# Patient Record
Sex: FEMALE | Race: WHITE | URBAN - METROPOLITAN AREA
[De-identification: names, ages, dates, MRNs, and addresses within clinical notes are randomized per-mention and may not be internally consistent; named-entity substitution may affect disease eponyms.]

---

## 2017-08-23 ENCOUNTER — OFFICE VISIT (OUTPATIENT)
Dept: FAMILY MEDICINE CLINIC | Age: 22
End: 2017-08-23

## 2017-08-23 VITALS
HEART RATE: 84 BPM | HEIGHT: 64 IN | DIASTOLIC BLOOD PRESSURE: 80 MMHG | OXYGEN SATURATION: 98 % | BODY MASS INDEX: 32.28 KG/M2 | RESPIRATION RATE: 16 BRPM | TEMPERATURE: 98.7 F | WEIGHT: 189.1 LBS | SYSTOLIC BLOOD PRESSURE: 118 MMHG

## 2017-08-23 DIAGNOSIS — Z72.0 TOBACCO ABUSE: Chronic | ICD-10-CM

## 2017-08-23 DIAGNOSIS — E66.09 NON MORBID OBESITY DUE TO EXCESS CALORIES: ICD-10-CM

## 2017-08-23 DIAGNOSIS — Z00.00 WELL ADULT EXAM: Primary | ICD-10-CM

## 2017-08-23 DIAGNOSIS — Z11.3 SCREEN FOR STD (SEXUALLY TRANSMITTED DISEASE): ICD-10-CM

## 2017-08-23 LAB
BASOPHILS ABSOLUTE: 0.1 K/UL (ref 0–0.2)
BASOPHILS RELATIVE PERCENT: 0.5 %
EOSINOPHILS ABSOLUTE: 0.4 K/UL (ref 0–0.6)
EOSINOPHILS RELATIVE PERCENT: 4.5 %
HCT VFR BLD CALC: 43.9 % (ref 36–48)
HEMOGLOBIN: 14.5 G/DL (ref 12–16)
LYMPHOCYTES ABSOLUTE: 2.1 K/UL (ref 1–5.1)
LYMPHOCYTES RELATIVE PERCENT: 21.6 %
MCH RBC QN AUTO: 31.7 PG (ref 26–34)
MCHC RBC AUTO-ENTMCNC: 33 G/DL (ref 31–36)
MCV RBC AUTO: 96.1 FL (ref 80–100)
MONOCYTES ABSOLUTE: 0.8 K/UL (ref 0–1.3)
MONOCYTES RELATIVE PERCENT: 7.9 %
NEUTROPHILS ABSOLUTE: 6.4 K/UL (ref 1.7–7.7)
NEUTROPHILS RELATIVE PERCENT: 65.5 %
PDW BLD-RTO: 13.2 % (ref 12.4–15.4)
PLATELET # BLD: 247 K/UL (ref 135–450)
PMV BLD AUTO: 9.3 FL (ref 5–10.5)
RBC # BLD: 4.56 M/UL (ref 4–5.2)
WBC # BLD: 9.8 K/UL (ref 4–11)

## 2017-08-23 PROCEDURE — 90471 IMMUNIZATION ADMIN: CPT | Performed by: FAMILY MEDICINE

## 2017-08-23 PROCEDURE — 90732 PPSV23 VACC 2 YRS+ SUBQ/IM: CPT | Performed by: FAMILY MEDICINE

## 2017-08-23 PROCEDURE — 90472 IMMUNIZATION ADMIN EACH ADD: CPT | Performed by: FAMILY MEDICINE

## 2017-08-23 PROCEDURE — 99395 PREV VISIT EST AGE 18-39: CPT | Performed by: FAMILY MEDICINE

## 2017-08-23 PROCEDURE — 90649 4VHPV VACCINE 3 DOSE IM: CPT | Performed by: FAMILY MEDICINE

## 2017-08-23 RX ORDER — ALBUTEROL SULFATE 90 UG/1
AEROSOL, METERED RESPIRATORY (INHALATION)
Qty: 8.5 G | Refills: 2 | Status: SHIPPED | OUTPATIENT
Start: 2017-08-23

## 2017-08-23 ASSESSMENT — ENCOUNTER SYMPTOMS
VOMITING: 0
ABDOMINAL PAIN: 0
SHORTNESS OF BREATH: 0
EYE ITCHING: 0
WHEEZING: 0
EYE REDNESS: 0
CHEST TIGHTNESS: 0
TROUBLE SWALLOWING: 0
RHINORRHEA: 0
NAUSEA: 0

## 2017-08-24 LAB
CHOLESTEROL, TOTAL: 137 MG/DL (ref 0–199)
HBV SURFACE AB TITR SER: <3.5 MUL/ML
HDLC SERPL-MCNC: 44 MG/DL (ref 40–60)
HEPATITIS C ANTIBODY INTERPRETATION: NORMAL
HIV-1 AND HIV-2 ANTIBODIES: NORMAL
LDL CHOLESTEROL CALCULATED: 78 MG/DL
RPR: NORMAL
TRIGL SERPL-MCNC: 73 MG/DL (ref 0–150)
VLDLC SERPL CALC-MCNC: 15 MG/DL

## 2017-08-29 ENCOUNTER — TELEPHONE (OUTPATIENT)
Dept: FAMILY MEDICINE CLINIC | Age: 22
End: 2017-08-29

## 2017-12-05 ENCOUNTER — TELEPHONE (OUTPATIENT)
Dept: FAMILY MEDICINE CLINIC | Age: 22
End: 2017-12-05

## 2017-12-05 RX ORDER — QUETIAPINE FUMARATE 100 MG/1
100 TABLET, FILM COATED ORAL NIGHTLY PRN
Qty: 30 TABLET | Refills: 0 | Status: SHIPPED | OUTPATIENT
Start: 2017-12-05 | End: 2018-02-26 | Stop reason: SDUPTHER

## 2017-12-05 NOTE — TELEPHONE ENCOUNTER
[phone]    Marge Hoffman    PT was in rehab, was given 100 MG Seraquil (sleep aid)  Wants to know if she can get a script for it now that she's out. PT has been out of rehab for a couple of months, but has been doing outpatient classes. PT last seen 8/23/17    Please advise patient.

## 2018-02-26 ENCOUNTER — OFFICE VISIT (OUTPATIENT)
Dept: FAMILY MEDICINE CLINIC | Age: 23
End: 2018-02-26

## 2018-02-26 VITALS
BODY MASS INDEX: 35.68 KG/M2 | HEIGHT: 64 IN | HEART RATE: 85 BPM | OXYGEN SATURATION: 98 % | DIASTOLIC BLOOD PRESSURE: 68 MMHG | WEIGHT: 209 LBS | SYSTOLIC BLOOD PRESSURE: 106 MMHG | TEMPERATURE: 98 F | RESPIRATION RATE: 16 BRPM

## 2018-02-26 DIAGNOSIS — R05.9 COUGH: Primary | ICD-10-CM

## 2018-02-26 PROCEDURE — G8484 FLU IMMUNIZE NO ADMIN: HCPCS | Performed by: FAMILY MEDICINE

## 2018-02-26 PROCEDURE — G8417 CALC BMI ABV UP PARAM F/U: HCPCS | Performed by: FAMILY MEDICINE

## 2018-02-26 PROCEDURE — 4004F PT TOBACCO SCREEN RCVD TLK: CPT | Performed by: FAMILY MEDICINE

## 2018-02-26 PROCEDURE — G8427 DOCREV CUR MEDS BY ELIG CLIN: HCPCS | Performed by: FAMILY MEDICINE

## 2018-02-26 PROCEDURE — 99213 OFFICE O/P EST LOW 20 MIN: CPT | Performed by: FAMILY MEDICINE

## 2018-02-26 RX ORDER — FLUTICASONE PROPIONATE 50 MCG
2 SPRAY, SUSPENSION (ML) NASAL DAILY
Qty: 1 BOTTLE | Refills: 3 | Status: SHIPPED | OUTPATIENT
Start: 2018-02-26 | End: 2019-09-16 | Stop reason: SDUPTHER

## 2018-02-26 RX ORDER — QUETIAPINE FUMARATE 100 MG/1
100 TABLET, FILM COATED ORAL NIGHTLY PRN
Qty: 30 TABLET | Refills: 3 | Status: SHIPPED | OUTPATIENT
Start: 2018-02-26 | End: 2019-05-23 | Stop reason: ALTCHOICE

## 2018-02-26 ASSESSMENT — ENCOUNTER SYMPTOMS
WHEEZING: 0
SORE THROAT: 0
HEMOPTYSIS: 0
HEARTBURN: 0
RHINORRHEA: 0
COUGH: 1
SHORTNESS OF BREATH: 0

## 2018-02-26 NOTE — PROGRESS NOTES
Subjective:      Patient ID: Quan Collazo is a 20 y.o. female. Cough   This is a recurrent problem. The current episode started 1 to 4 weeks ago. The problem has been waxing and waning. The problem occurs every few minutes. The cough is productive of sputum. Associated symptoms include nasal congestion and postnasal drip. Pertinent negatives include no chest pain, chills, ear congestion, ear pain, fever, headaches, heartburn, hemoptysis, myalgias, rash, rhinorrhea, sore throat, shortness of breath, sweats, weight loss or wheezing. Nothing aggravates the symptoms. Risk factors for lung disease include smoking/tobacco exposure. She has tried a beta-agonist inhaler for the symptoms. The treatment provided moderate relief. Her past medical history is significant for asthma, bronchitis and environmental allergies. Review of Systems   Constitutional: Negative for chills, fever and weight loss. HENT: Positive for postnasal drip. Negative for ear pain, rhinorrhea and sore throat. Respiratory: Positive for cough. Negative for hemoptysis, shortness of breath and wheezing. Cardiovascular: Negative for chest pain. Gastrointestinal: Negative for heartburn. Musculoskeletal: Negative for myalgias. Skin: Negative for rash. Allergic/Immunologic: Positive for environmental allergies. Neurological: Negative for headaches. Objective:   Physical Exam   Constitutional: She is oriented to person, place, and time. She appears well-developed and well-nourished. No distress. HENT:   Head: Normocephalic. Head is without right periorbital erythema and without left periorbital erythema. Right Ear: Hearing and external ear normal. No drainage, swelling or tenderness. No middle ear effusion. No decreased hearing is noted. Left Ear: Hearing and external ear normal. No drainage, swelling or tenderness. No middle ear effusion. No decreased hearing is noted. Nose: Mucosal edema present.  No rhinorrhea,

## 2018-07-30 ENCOUNTER — OFFICE VISIT (OUTPATIENT)
Dept: FAMILY MEDICINE CLINIC | Age: 23
End: 2018-07-30

## 2018-07-30 VITALS
WEIGHT: 221 LBS | SYSTOLIC BLOOD PRESSURE: 110 MMHG | HEIGHT: 64 IN | OXYGEN SATURATION: 98 % | HEART RATE: 99 BPM | RESPIRATION RATE: 16 BRPM | TEMPERATURE: 97.8 F | DIASTOLIC BLOOD PRESSURE: 76 MMHG | BODY MASS INDEX: 37.73 KG/M2

## 2018-07-30 DIAGNOSIS — Z71.6 TOBACCO ABUSE COUNSELING: ICD-10-CM

## 2018-07-30 DIAGNOSIS — Z72.0 TOBACCO ABUSE: ICD-10-CM

## 2018-07-30 DIAGNOSIS — H69.81 DYSFUNCTION OF RIGHT EUSTACHIAN TUBE: ICD-10-CM

## 2018-07-30 DIAGNOSIS — J45.40 MODERATE PERSISTENT ASTHMA WITHOUT COMPLICATION: Primary | ICD-10-CM

## 2018-07-30 DIAGNOSIS — K21.9 GASTROESOPHAGEAL REFLUX DISEASE WITHOUT ESOPHAGITIS: ICD-10-CM

## 2018-07-30 PROCEDURE — 4004F PT TOBACCO SCREEN RCVD TLK: CPT | Performed by: FAMILY MEDICINE

## 2018-07-30 PROCEDURE — 99214 OFFICE O/P EST MOD 30 MIN: CPT | Performed by: FAMILY MEDICINE

## 2018-07-30 PROCEDURE — G8417 CALC BMI ABV UP PARAM F/U: HCPCS | Performed by: FAMILY MEDICINE

## 2018-07-30 PROCEDURE — G8427 DOCREV CUR MEDS BY ELIG CLIN: HCPCS | Performed by: FAMILY MEDICINE

## 2018-07-30 RX ORDER — OMEPRAZOLE 20 MG/1
20 CAPSULE, DELAYED RELEASE ORAL DAILY
Qty: 30 CAPSULE | Refills: 3 | Status: SHIPPED | OUTPATIENT
Start: 2018-07-30

## 2018-07-30 RX ORDER — ALBUTEROL SULFATE 90 UG/1
2 AEROSOL, METERED RESPIRATORY (INHALATION) EVERY 6 HOURS PRN
Qty: 1 INHALER | Refills: 3 | Status: SHIPPED | OUTPATIENT
Start: 2018-07-30

## 2018-07-30 RX ORDER — PREDNISONE 20 MG/1
40 TABLET ORAL DAILY
Qty: 10 TABLET | Refills: 0 | Status: SHIPPED | OUTPATIENT
Start: 2018-07-30 | End: 2018-08-04

## 2018-07-30 RX ORDER — FLUTICASONE PROPIONATE 50 MCG
1 SPRAY, SUSPENSION (ML) NASAL DAILY
Qty: 1 BOTTLE | Refills: 3 | Status: SHIPPED | OUTPATIENT
Start: 2018-07-30

## 2018-07-30 ASSESSMENT — PATIENT HEALTH QUESTIONNAIRE - PHQ9
1. LITTLE INTEREST OR PLEASURE IN DOING THINGS: 0
SUM OF ALL RESPONSES TO PHQ QUESTIONS 1-9: 0
SUM OF ALL RESPONSES TO PHQ9 QUESTIONS 1 & 2: 0
2. FEELING DOWN, DEPRESSED OR HOPELESS: 0

## 2018-07-30 ASSESSMENT — ENCOUNTER SYMPTOMS
COUGH: 1
SORE THROAT: 0
HEARTBURN: 0
RHINORRHEA: 0
HEMOPTYSIS: 0
SHORTNESS OF BREATH: 1
WHEEZING: 1

## 2018-07-30 NOTE — PROGRESS NOTES
ibuprofen (ADVIL;MOTRIN) 800 MG tablet, Take 1 tablet by mouth every 8 hours as needed for Pain, Disp: 30 tablet, Rfl: 0    ondansetron (ZOFRAN ODT) 4 MG disintegrating tablet, Take 1-2 tablets by mouth every 12 hours as needed for Nausea May Sub regular tablet (non-ODT) if insurance does not cover ODT., Disp: 12 tablet, Rfl: 0    famotidine (PEPCID) 20 MG tablet, Take 1 tablet by mouth 2 times daily, Disp: 60 tablet, Rfl: 0    hyoscyamine (ANASPAZ;LEVSIN) 125 MCG tablet, Take 125 mcg by mouth every 4 hours as needed for Cramping, Disp: , Rfl:     levonorgestrel (MIRENA, 52 MG,) IUD 52 mg, 1 each by Intrauterine route once, Disp: , Rfl:     PROAIR  (90 BASE) MCG/ACT inhaler, INHALE 2 PUFFS EVERY 6 HOURS AS NEEDED FOR WHEEZING OR SHORTNESS OF BREATH, Disp: 8.5 g, Rfl: 0    sertraline (ZOLOFT) 50 MG tablet, Take 1 tablet by mouth daily. , Disp: 30 tablet, Rfl: 3     has a past medical history of Asthma and Chicken pox. No past surgical history on file. reports that she has been smoking Cigarettes. She has been smoking about 0.50 packs per day. She has never used smokeless tobacco. She reports that she does not drink alcohol or use drugs. family history includes Coronary Art Dis in her father; Urolithiasis in an other family member. Objective:   Physical Exam   Constitutional: She is oriented to person, place, and time. She appears well-developed and well-nourished. No distress. HENT:   Head: Normocephalic. Head is without right periorbital erythema and without left periorbital erythema. Right Ear: Hearing and external ear normal. No drainage, swelling or tenderness. No middle ear effusion. No decreased hearing is noted. Left Ear: Hearing and external ear normal. No drainage, swelling or tenderness. No middle ear effusion. No decreased hearing is noted. Nose: Mucosal edema present. No rhinorrhea, sinus tenderness, nasal deformity or septal deviation.  Right sinus exhibits no maxillary sinus tenderness and no frontal sinus tenderness. Left sinus exhibits no maxillary sinus tenderness and no frontal sinus tenderness. Mouth/Throat: Oropharynx is clear and moist. No oropharyngeal exudate. OP mile erythema, no exudates     Eyes: Conjunctivae and EOM are normal. Pupils are equal, round, and reactive to light. Neck: Normal range of motion. Neck supple. No JVD present. Cardiovascular: Normal rate, regular rhythm and normal heart sounds. Exam reveals no gallop and no friction rub. No murmur heard. Pulmonary/Chest: Effort normal. No respiratory distress. She has no wheezes. She has no rales. She exhibits no tenderness. Coarse breath sounds    No crackles. Musculoskeletal: Normal range of motion. Lymphadenopathy:     She has no cervical adenopathy. Neurological: She is alert and oriented to person, place, and time. No cranial nerve deficit. She exhibits normal muscle tone. Coordination normal.   Skin: Skin is warm. No rash noted. She is not diaphoretic. Psychiatric: She has a normal mood and affect. Thought content normal.   Nursing note and vitals reviewed. Assessment:       Diagnosis Orders   1. Moderate persistent asthma without complication  albuterol sulfate HFA (VENTOLIN HFA) 108 (90 Base) MCG/ACT inhaler    mometasone (ASMANEX HFA) 100 MCG/ACT AERO inhaler    predniSONE (DELTASONE) 20 MG tablet   2. Dysfunction of right eustachian tube  fluticasone (FLONASE) 50 MCG/ACT nasal spray   3. Gastroesophageal reflux disease without esophagitis  omeprazole (PRILOSEC) 20 MG delayed release capsule   4. Tobacco abuse     5. Tobacco abuse counseling             Plan:      1. asmanex bid   Albuterol prn   Prednisone for 5 days   Fu 2 weeks  Patient advised to go to Ed or call 911 if sx worsen,  agrees and verbalizes understanding    2. Flonase, heating pad, otc anti histaminc prn     3. prilosec     4.  Counseling to stop smoking    Mario Fischer received counseling on the

## 2018-12-21 ENCOUNTER — TELEPHONE (OUTPATIENT)
Dept: FAMILY MEDICINE CLINIC | Age: 23
End: 2018-12-21

## 2018-12-21 RX ORDER — GUAIFENESIN 600 MG/1
600 TABLET, EXTENDED RELEASE ORAL 2 TIMES DAILY
Qty: 30 TABLET | Refills: 0 | Status: SHIPPED | OUTPATIENT
Start: 2018-12-21 | End: 2019-01-05

## 2018-12-21 NOTE — TELEPHONE ENCOUNTER
Leonides    PT is coughing up yellow phlegh\m.   Stuffy nose - no discharge  Sore throat  No fever    PT took Nyquil last night    Wants to know if there is an OTC medication or if we can call in a script    [facility] [address] [place] [address], [address] [place] [address] -  [phone]

## 2019-04-15 ENCOUNTER — TELEPHONE (OUTPATIENT)
Dept: FAMILY MEDICINE CLINIC | Age: 24
End: 2019-04-15

## 2019-04-15 NOTE — TELEPHONE ENCOUNTER
Jc 80 patient    PT has been experiencing nausea for 2 days. She tried Pepto Bis,ol. But it's not working.     PT can keep soup down  Has not tried Sync.ME [address] [phone] [facility] [phone]    Last seen 7/30/2018    Please advise patient

## 2019-05-23 ENCOUNTER — OFFICE VISIT (OUTPATIENT)
Dept: FAMILY MEDICINE CLINIC | Age: 24
End: 2019-05-23
Payer: COMMERCIAL

## 2019-05-23 VITALS
TEMPERATURE: 97 F | DIASTOLIC BLOOD PRESSURE: 78 MMHG | RESPIRATION RATE: 16 BRPM | OXYGEN SATURATION: 97 % | BODY MASS INDEX: 39.47 KG/M2 | SYSTOLIC BLOOD PRESSURE: 104 MMHG | HEART RATE: 78 BPM | HEIGHT: 64 IN | WEIGHT: 231.2 LBS

## 2019-05-23 DIAGNOSIS — Z23 NEED FOR DIPHTHERIA-TETANUS-PERTUSSIS (TDAP) VACCINE: ICD-10-CM

## 2019-05-23 DIAGNOSIS — R06.83 SNORING: ICD-10-CM

## 2019-05-23 DIAGNOSIS — F51.04 CHRONIC INSOMNIA: ICD-10-CM

## 2019-05-23 DIAGNOSIS — R63.5 WEIGHT GAIN: ICD-10-CM

## 2019-05-23 DIAGNOSIS — F17.200 SMOKER: ICD-10-CM

## 2019-05-23 DIAGNOSIS — J45.20 MILD INTERMITTENT ASTHMA WITHOUT COMPLICATION: Primary | ICD-10-CM

## 2019-05-23 PROCEDURE — 90471 IMMUNIZATION ADMIN: CPT | Performed by: FAMILY MEDICINE

## 2019-05-23 PROCEDURE — 99214 OFFICE O/P EST MOD 30 MIN: CPT | Performed by: FAMILY MEDICINE

## 2019-05-23 PROCEDURE — 90715 TDAP VACCINE 7 YRS/> IM: CPT | Performed by: FAMILY MEDICINE

## 2019-05-23 RX ORDER — TRAZODONE HYDROCHLORIDE 50 MG/1
50 TABLET ORAL NIGHTLY
Qty: 90 TABLET | Refills: 1 | Status: SHIPPED | OUTPATIENT
Start: 2019-05-23

## 2019-05-23 ASSESSMENT — ENCOUNTER SYMPTOMS
RHINORRHEA: 0
WHEEZING: 0
TROUBLE SWALLOWING: 0
HOARSE VOICE: 0
HEARTBURN: 0
COUGH: 0
DIFFICULTY BREATHING: 0
CHEST TIGHTNESS: 0
SORE THROAT: 0
FREQUENT THROAT CLEARING: 1
HEMOPTYSIS: 0
SPUTUM PRODUCTION: 1
SHORTNESS OF BREATH: 0

## 2019-05-23 ASSESSMENT — PATIENT HEALTH QUESTIONNAIRE - PHQ9
SUM OF ALL RESPONSES TO PHQ QUESTIONS 1-9: 0
1. LITTLE INTEREST OR PLEASURE IN DOING THINGS: 0
SUM OF ALL RESPONSES TO PHQ QUESTIONS 1-9: 0
SUM OF ALL RESPONSES TO PHQ9 QUESTIONS 1 & 2: 0
2. FEELING DOWN, DEPRESSED OR HOPELESS: 0

## 2019-05-23 NOTE — PROGRESS NOTES
Subjective:      Patient ID: Alfred Meza is a 30 y.o. female. Here for fu       Asthma   She complains of frequent throat clearing and sputum production. There is no chest tightness, cough, difficulty breathing, hemoptysis, hoarse voice, shortness of breath or wheezing. This is a chronic problem. The current episode started more than 1 year ago. The problem occurs rarely. The problem has been unchanged. Associated symptoms include malaise/fatigue. Pertinent negatives include no appetite change, chest pain, dyspnea on exertion, ear congestion, ear pain, fever, headaches, heartburn, myalgias, nasal congestion, orthopnea, PND, postnasal drip, rhinorrhea, sneezing, sore throat, sweats, trouble swallowing or weight loss. Her symptoms are aggravated by pollen. Her symptoms are alleviated by beta-agonist. She reports significant improvement on treatment. Risk factors for lung disease include smoking/tobacco exposure. Her past medical history is significant for asthma. Insomnia  Onset of sx:  Months ago  Sx described as: unable to stayed asleep  Patient denies:anxiety or depression  Symptoms have been: stable  Medications tried:seroquel that causes fatigued, trazodone was helping in the past  Life style changes: yes  Worse with: nothing    Snoring  Onset years ago, worse with wt gain,   + fatigue  Occasional ha    Wt gain  Has not tried to diet or exericse    Smoker  Not ready to quit  Now she smokes 1ppd      Review of Systems   Constitutional: Positive for malaise/fatigue. Negative for appetite change, fever and weight loss. HENT: Negative for ear pain, hoarse voice, postnasal drip, rhinorrhea, sneezing, sore throat and trouble swallowing. Respiratory: Positive for sputum production. Negative for cough, hemoptysis, shortness of breath and wheezing. Cardiovascular: Negative for chest pain, dyspnea on exertion and PND. Gastrointestinal: Negative for heartburn.    Musculoskeletal: Negative for [facility]   4. Need for diphtheria-tetanus-pertussis (Tdap) vaccine  Tdap (age 10y-63y) IM (ADACEL)   5. Weight gain     6. Smoker             Plan:      1. Stable  encourage to quit smoking  Albuterol prn   Tdap today    2. Sleep hygiene  Change med to trazodone  patient has been instructed caution with driving or handling of heavy machinery while taking  this medication as it  can cause drowsiness,  patient agrees and verbalizes understanding     3. Referral    5. Counseling: encourage to adjust caloric and fat  intake to maintain or achieve ideal body weight, to emphasize fruits, vegetables, whole grains, if possible drink  vegetable milks, reduce meats, poultry, fish, and rich in legume like beans,lentus, chickpeas ,  nuts. Exercise is a life-long commitment. 6. Smoking cessation was encouraged. Cessation techniques reviewed today. Fu 4 weeks.           Vashti Armijo MD

## 2019-08-20 ENCOUNTER — TELEPHONE (OUTPATIENT)
Dept: PULMONOLOGY | Age: 24
End: 2019-08-20

## 2019-08-20 NOTE — TELEPHONE ENCOUNTER
Patient did not show for New Pt Sleep appointment  with Elyce Schaumann on 8/20/19    Same Day Cancellation: No    Patient rescheduled:  No    New appointment:     Patient was also no show on: N/A    LOV N/A

## 2019-09-16 ENCOUNTER — TELEPHONE (OUTPATIENT)
Dept: FAMILY MEDICINE CLINIC | Age: 24
End: 2019-09-16

## 2019-09-16 RX ORDER — FLUTICASONE PROPIONATE 50 MCG
2 SPRAY, SUSPENSION (ML) NASAL DAILY
Qty: 1 BOTTLE | Refills: 3 | Status: SHIPPED | OUTPATIENT
Start: 2019-09-16

## 2019-09-16 NOTE — TELEPHONE ENCOUNTER
Eliot says she has a sore throat, and allergy-like symptoms. She says this is worst than her usual allergies. Coughing - dry  Hurts to swallow  Nasal congestion, no production  No headache or fever  Feels weak    Wants to be seen, or have us call in a script for her.     [facility] across the street from us on [address] / [address]    Last seen 5/23/2019

## 2019-09-17 RX ORDER — MOMETASONE FUROATE 50 UG/1
2 SPRAY, METERED NASAL DAILY
Qty: 1 INHALER | Refills: 3 | Status: SHIPPED | OUTPATIENT
Start: 2019-09-17

## 2020-05-05 RX ORDER — TRAZODONE HYDROCHLORIDE 50 MG/1
TABLET ORAL
Qty: 90 TABLET | Refills: 1 | OUTPATIENT
Start: 2020-05-05

## 2023-06-30 ENCOUNTER — OFFICE VISIT (OUTPATIENT)
Dept: ENT CLINIC | Age: 28
End: 2023-06-30

## 2023-06-30 VITALS
WEIGHT: 235 LBS | OXYGEN SATURATION: 97 % | HEIGHT: 64 IN | DIASTOLIC BLOOD PRESSURE: 65 MMHG | TEMPERATURE: 97.5 F | SYSTOLIC BLOOD PRESSURE: 101 MMHG | HEART RATE: 93 BPM | BODY MASS INDEX: 40.12 KG/M2

## 2023-06-30 DIAGNOSIS — H66.004 RECURRENT ACUTE SUPPURATIVE OTITIS MEDIA OF RIGHT EAR WITHOUT SPONTANEOUS RUPTURE OF TYMPANIC MEMBRANE: Primary | ICD-10-CM

## 2023-06-30 RX ORDER — CEFUROXIME AXETIL 250 MG/1
250 TABLET ORAL 2 TIMES DAILY
Qty: 20 TABLET | Refills: 0 | Status: SHIPPED | OUTPATIENT
Start: 2023-06-30 | End: 2023-07-10

## 2023-06-30 ASSESSMENT — ENCOUNTER SYMPTOMS
SINUS PAIN: 0
RHINORRHEA: 0
SINUS PRESSURE: 0
COUGH: 1
SORE THROAT: 0

## 2025-05-25 ENCOUNTER — HOSPITAL ENCOUNTER (INPATIENT)
Age: 30
LOS: 2 days | Discharge: HOME HEALTH | DRG: 313 | End: 2025-05-28
Attending: EMERGENCY MEDICINE | Admitting: INTERNAL MEDICINE
Payer: COMMERCIAL

## 2025-05-25 ENCOUNTER — APPOINTMENT (OUTPATIENT)
Dept: GENERAL RADIOLOGY | Age: 30
DRG: 313 | End: 2025-05-25
Payer: COMMERCIAL

## 2025-05-25 DIAGNOSIS — M00.9 PYOGENIC ARTHRITIS OF RIGHT KNEE JOINT, DUE TO UNSPECIFIED ORGANISM (HCC): Primary | ICD-10-CM

## 2025-05-25 LAB
BASOPHILS # BLD: 0 K/UL (ref 0–0.2)
BASOPHILS NFR BLD: 0.4 %
DEPRECATED RDW RBC AUTO: 13.8 % (ref 12.4–15.4)
EOSINOPHIL # BLD: 0.3 K/UL (ref 0–0.6)
EOSINOPHIL NFR BLD: 2.9 %
HCT VFR BLD AUTO: 36.6 % (ref 36–48)
HGB BLD-MCNC: 12.6 G/DL (ref 12–16)
LYMPHOCYTES # BLD: 2.4 K/UL (ref 1–5.1)
LYMPHOCYTES NFR BLD: 22.8 %
MCH RBC QN AUTO: 31.5 PG (ref 26–34)
MCHC RBC AUTO-ENTMCNC: 34.3 G/DL (ref 31–36)
MCV RBC AUTO: 91.8 FL (ref 80–100)
MONOCYTES # BLD: 0.9 K/UL (ref 0–1.3)
MONOCYTES NFR BLD: 8.6 %
NEUTROPHILS # BLD: 6.8 K/UL (ref 1.7–7.7)
NEUTROPHILS NFR BLD: 65.3 %
PLATELET # BLD AUTO: 231 K/UL (ref 135–450)
PMV BLD AUTO: 8.6 FL (ref 5–10.5)
RBC # BLD AUTO: 3.99 M/UL (ref 4–5.2)
WBC # BLD AUTO: 10.5 K/UL (ref 4–11)

## 2025-05-25 PROCEDURE — 86140 C-REACTIVE PROTEIN: CPT

## 2025-05-25 PROCEDURE — 73560 X-RAY EXAM OF KNEE 1 OR 2: CPT

## 2025-05-25 PROCEDURE — 80048 BASIC METABOLIC PNL TOTAL CA: CPT

## 2025-05-25 PROCEDURE — 89051 BODY FLUID CELL COUNT: CPT

## 2025-05-25 PROCEDURE — 99285 EMERGENCY DEPT VISIT HI MDM: CPT

## 2025-05-25 PROCEDURE — 85652 RBC SED RATE AUTOMATED: CPT

## 2025-05-25 PROCEDURE — 6360000002 HC RX W HCPCS: Performed by: STUDENT IN AN ORGANIZED HEALTH CARE EDUCATION/TRAINING PROGRAM

## 2025-05-25 PROCEDURE — 96374 THER/PROPH/DIAG INJ IV PUSH: CPT

## 2025-05-25 PROCEDURE — 85025 COMPLETE CBC W/AUTO DIFF WBC: CPT

## 2025-05-25 RX ORDER — HYDROMORPHONE HYDROCHLORIDE 1 MG/ML
0.5 INJECTION, SOLUTION INTRAMUSCULAR; INTRAVENOUS; SUBCUTANEOUS ONCE
Status: COMPLETED | OUTPATIENT
Start: 2025-05-25 | End: 2025-05-25

## 2025-05-25 RX ORDER — LIDOCAINE HYDROCHLORIDE AND EPINEPHRINE 10; 10 MG/ML; UG/ML
20 INJECTION, SOLUTION INFILTRATION; PERINEURAL ONCE
Status: COMPLETED | OUTPATIENT
Start: 2025-05-25 | End: 2025-05-26

## 2025-05-25 RX ADMIN — HYDROMORPHONE HYDROCHLORIDE 0.5 MG: 1 INJECTION, SOLUTION INTRAMUSCULAR; INTRAVENOUS; SUBCUTANEOUS at 23:59

## 2025-05-25 ASSESSMENT — PAIN SCALES - GENERAL
PAINLEVEL_OUTOF10: 7
PAINLEVEL_OUTOF10: 7

## 2025-05-25 ASSESSMENT — PAIN DESCRIPTION - DESCRIPTORS
DESCRIPTORS: PRESSURE;DISCOMFORT;ACHING
DESCRIPTORS: DISCOMFORT;TIGHTNESS

## 2025-05-25 ASSESSMENT — PAIN DESCRIPTION - LOCATION
LOCATION: KNEE
LOCATION: KNEE

## 2025-05-25 ASSESSMENT — PAIN DESCRIPTION - ONSET: ONSET: PROGRESSIVE

## 2025-05-25 ASSESSMENT — PAIN DESCRIPTION - PAIN TYPE: TYPE: ACUTE PAIN

## 2025-05-25 ASSESSMENT — PAIN DESCRIPTION - ORIENTATION
ORIENTATION: RIGHT
ORIENTATION: RIGHT

## 2025-05-25 ASSESSMENT — PAIN - FUNCTIONAL ASSESSMENT: PAIN_FUNCTIONAL_ASSESSMENT: 0-10

## 2025-05-25 ASSESSMENT — PAIN DESCRIPTION - FREQUENCY: FREQUENCY: CONTINUOUS

## 2025-05-26 ENCOUNTER — ANESTHESIA EVENT (OUTPATIENT)
Dept: OPERATING ROOM | Age: 30
DRG: 313 | End: 2025-05-26
Payer: COMMERCIAL

## 2025-05-26 ENCOUNTER — ANESTHESIA (OUTPATIENT)
Dept: OPERATING ROOM | Age: 30
DRG: 313 | End: 2025-05-26
Payer: COMMERCIAL

## 2025-05-26 PROBLEM — M00.9 SEPTIC ARTHRITIS (HCC): Status: ACTIVE | Noted: 2025-05-26

## 2025-05-26 PROBLEM — E66.811 OBESITY (BMI 30.0-34.9): Status: ACTIVE | Noted: 2025-05-26

## 2025-05-26 LAB
ANION GAP SERPL CALCULATED.3IONS-SCNC: 11 MMOL/L (ref 3–16)
ANION GAP SERPL CALCULATED.3IONS-SCNC: 8 MMOL/L (ref 3–16)
APPEARANCE FLUID: NORMAL
APPEARANCE FLUID: NORMAL
BDY FLUID QUALITY: NORMAL
BDY FLUID QUALITY: NORMAL
BUN SERPL-MCNC: 14 MG/DL (ref 7–20)
BUN SERPL-MCNC: 14 MG/DL (ref 7–20)
CALCIUM SERPL-MCNC: 8.4 MG/DL (ref 8.3–10.6)
CALCIUM SERPL-MCNC: 8.9 MG/DL (ref 8.3–10.6)
CELL COUNT FLUID TYPE: NORMAL
CELL COUNT FLUID TYPE: NORMAL
CHLORIDE SERPL-SCNC: 105 MMOL/L (ref 99–110)
CHLORIDE SERPL-SCNC: 106 MMOL/L (ref 99–110)
CO2 SERPL-SCNC: 23 MMOL/L (ref 21–32)
CO2 SERPL-SCNC: 24 MMOL/L (ref 21–32)
COLOR FLUID: NORMAL
COLOR FLUID: NORMAL
CREAT SERPL-MCNC: 0.7 MG/DL (ref 0.6–1.1)
CREAT SERPL-MCNC: 0.7 MG/DL (ref 0.6–1.1)
CRP SERPL-MCNC: 38.2 MG/L (ref 0–5.1)
CRYSTALS FLD MICRO: NORMAL
CRYSTALS FLD MICRO: NORMAL
ERYTHROCYTE [SEDIMENTATION RATE] IN BLOOD BY WESTERGREN METHOD: 17 MM/HR (ref 0–20)
GFR SERPLBLD CREATININE-BSD FMLA CKD-EPI: >90 ML/MIN/{1.73_M2}
GFR SERPLBLD CREATININE-BSD FMLA CKD-EPI: >90 ML/MIN/{1.73_M2}
GLUCOSE SERPL-MCNC: 90 MG/DL (ref 70–99)
GLUCOSE SERPL-MCNC: 98 MG/DL (ref 70–99)
HCG SERPL QL: NEGATIVE
HCG UR QL: NEGATIVE
LYMPHOCYTES NFR FLD: 3 %
LYMPHOCYTES NFR FLD: 6 %
MONOCYTES NFR FLD: 15 %
MONOCYTES NFR FLD: 3 %
NEUTROPHIL, FLUID: 82 %
NEUTROPHIL, FLUID: 91 %
NUC CELL # FLD: NORMAL /CUMM
NUC CELL # FLD: NORMAL /CUMM
POTASSIUM SERPL-SCNC: 3.7 MMOL/L (ref 3.5–5.1)
POTASSIUM SERPL-SCNC: 4 MMOL/L (ref 3.5–5.1)
RBC FLUID: 2500 /CUMM
RBC FLUID: NORMAL /CUMM
SODIUM SERPL-SCNC: 138 MMOL/L (ref 136–145)
SODIUM SERPL-SCNC: 139 MMOL/L (ref 136–145)
SPECIMEN SOURCE FLD: NORMAL
SPECIMEN SOURCE FLD: NORMAL
TOTAL CELLS COUNTED FLD: 100
TOTAL CELLS COUNTED FLD: 100
VANCOMYCIN SERPL-MCNC: 14.2 UG/ML

## 2025-05-26 PROCEDURE — 2500000003 HC RX 250 WO HCPCS: Performed by: ANESTHESIOLOGY

## 2025-05-26 PROCEDURE — 83036 HEMOGLOBIN GLYCOSYLATED A1C: CPT

## 2025-05-26 PROCEDURE — 3700000000 HC ANESTHESIA ATTENDED CARE: Performed by: ORTHOPAEDIC SURGERY

## 2025-05-26 PROCEDURE — 36415 COLL VENOUS BLD VENIPUNCTURE: CPT

## 2025-05-26 PROCEDURE — 86701 HIV-1ANTIBODY: CPT

## 2025-05-26 PROCEDURE — 2500000003 HC RX 250 WO HCPCS: Performed by: INTERNAL MEDICINE

## 2025-05-26 PROCEDURE — 86618 LYME DISEASE ANTIBODY: CPT

## 2025-05-26 PROCEDURE — 2709999900 HC NON-CHARGEABLE SUPPLY: Performed by: ORTHOPAEDIC SURGERY

## 2025-05-26 PROCEDURE — 6360000002 HC RX W HCPCS: Performed by: ANESTHESIOLOGY

## 2025-05-26 PROCEDURE — 80048 BASIC METABOLIC PNL TOTAL CA: CPT

## 2025-05-26 PROCEDURE — 6370000000 HC RX 637 (ALT 250 FOR IP): Performed by: HOSPITALIST

## 2025-05-26 PROCEDURE — 3600000014 HC SURGERY LEVEL 4 ADDTL 15MIN: Performed by: ORTHOPAEDIC SURGERY

## 2025-05-26 PROCEDURE — 87390 HIV-1 AG IA: CPT

## 2025-05-26 PROCEDURE — 6360000002 HC RX W HCPCS: Performed by: ORTHOPAEDIC SURGERY

## 2025-05-26 PROCEDURE — 87591 N.GONORRHOEAE DNA AMP PROB: CPT

## 2025-05-26 PROCEDURE — 87205 SMEAR GRAM STAIN: CPT

## 2025-05-26 PROCEDURE — 2580000003 HC RX 258: Performed by: INTERNAL MEDICINE

## 2025-05-26 PROCEDURE — 0S9C0ZZ DRAINAGE OF RIGHT KNEE JOINT, OPEN APPROACH: ICD-10-PCS | Performed by: ORTHOPAEDIC SURGERY

## 2025-05-26 PROCEDURE — 7100000001 HC PACU RECOVERY - ADDTL 15 MIN: Performed by: ORTHOPAEDIC SURGERY

## 2025-05-26 PROCEDURE — 87075 CULTR BACTERIA EXCEPT BLOOD: CPT

## 2025-05-26 PROCEDURE — 86702 HIV-2 ANTIBODY: CPT

## 2025-05-26 PROCEDURE — 87491 CHLMYD TRACH DNA AMP PROBE: CPT

## 2025-05-26 PROCEDURE — 1200000000 HC SEMI PRIVATE

## 2025-05-26 PROCEDURE — 2500000003 HC RX 250 WO HCPCS: Performed by: ORTHOPAEDIC SURGERY

## 2025-05-26 PROCEDURE — 84703 CHORIONIC GONADOTROPIN ASSAY: CPT

## 2025-05-26 PROCEDURE — 2580000003 HC RX 258: Performed by: ANESTHESIOLOGY

## 2025-05-26 PROCEDURE — 6360000002 HC RX W HCPCS: Performed by: STUDENT IN AN ORGANIZED HEALTH CARE EDUCATION/TRAINING PROGRAM

## 2025-05-26 PROCEDURE — 7100000000 HC PACU RECOVERY - FIRST 15 MIN: Performed by: ORTHOPAEDIC SURGERY

## 2025-05-26 PROCEDURE — 80202 ASSAY OF VANCOMYCIN: CPT

## 2025-05-26 PROCEDURE — 6360000002 HC RX W HCPCS: Performed by: INTERNAL MEDICINE

## 2025-05-26 PROCEDURE — 89051 BODY FLUID CELL COUNT: CPT

## 2025-05-26 PROCEDURE — 3600000004 HC SURGERY LEVEL 4 BASE: Performed by: ORTHOPAEDIC SURGERY

## 2025-05-26 PROCEDURE — 3700000001 HC ADD 15 MINUTES (ANESTHESIA): Performed by: ORTHOPAEDIC SURGERY

## 2025-05-26 PROCEDURE — 89060 EXAM SYNOVIAL FLUID CRYSTALS: CPT

## 2025-05-26 PROCEDURE — 87070 CULTURE OTHR SPECIMN AEROBIC: CPT

## 2025-05-26 PROCEDURE — 99255 IP/OBS CONSLTJ NEW/EST HI 80: CPT | Performed by: INTERNAL MEDICINE

## 2025-05-26 RX ORDER — DEXAMETHASONE SODIUM PHOSPHATE 4 MG/ML
INJECTION, SOLUTION INTRA-ARTICULAR; INTRALESIONAL; INTRAMUSCULAR; INTRAVENOUS; SOFT TISSUE
Status: DISCONTINUED | OUTPATIENT
Start: 2025-05-26 | End: 2025-05-26 | Stop reason: SDUPTHER

## 2025-05-26 RX ORDER — POLYETHYLENE GLYCOL 3350 17 G/17G
17 POWDER, FOR SOLUTION ORAL DAILY PRN
Status: DISCONTINUED | OUTPATIENT
Start: 2025-05-26 | End: 2025-05-28 | Stop reason: HOSPADM

## 2025-05-26 RX ORDER — ACETAMINOPHEN 650 MG/1
650 SUPPOSITORY RECTAL EVERY 6 HOURS PRN
Status: DISCONTINUED | OUTPATIENT
Start: 2025-05-26 | End: 2025-05-28 | Stop reason: HOSPADM

## 2025-05-26 RX ORDER — OXYCODONE AND ACETAMINOPHEN 5; 325 MG/1; MG/1
2 TABLET ORAL EVERY 4 HOURS PRN
Refills: 0 | Status: DISCONTINUED | OUTPATIENT
Start: 2025-05-26 | End: 2025-05-28 | Stop reason: HOSPADM

## 2025-05-26 RX ORDER — HYDROMORPHONE HYDROCHLORIDE 1 MG/ML
0.25 INJECTION, SOLUTION INTRAMUSCULAR; INTRAVENOUS; SUBCUTANEOUS
Refills: 0 | Status: DISCONTINUED | OUTPATIENT
Start: 2025-05-26 | End: 2025-05-28 | Stop reason: HOSPADM

## 2025-05-26 RX ORDER — ALBUTEROL SULFATE 0.83 MG/ML
2.5 SOLUTION RESPIRATORY (INHALATION) EVERY 4 HOURS PRN
Status: DISCONTINUED | OUTPATIENT
Start: 2025-05-26 | End: 2025-05-28 | Stop reason: HOSPADM

## 2025-05-26 RX ORDER — ACETAMINOPHEN 500 MG
1000 TABLET ORAL ONCE
Status: DISCONTINUED | OUTPATIENT
Start: 2025-05-26 | End: 2025-05-28 | Stop reason: HOSPADM

## 2025-05-26 RX ORDER — ONDANSETRON 4 MG/1
4 TABLET, ORALLY DISINTEGRATING ORAL EVERY 8 HOURS PRN
Status: DISCONTINUED | OUTPATIENT
Start: 2025-05-26 | End: 2025-05-28 | Stop reason: HOSPADM

## 2025-05-26 RX ORDER — HYDROMORPHONE HYDROCHLORIDE 1 MG/ML
0.5 INJECTION, SOLUTION INTRAMUSCULAR; INTRAVENOUS; SUBCUTANEOUS
Refills: 0 | Status: DISCONTINUED | OUTPATIENT
Start: 2025-05-26 | End: 2025-05-28 | Stop reason: HOSPADM

## 2025-05-26 RX ORDER — ONDANSETRON 2 MG/ML
4 INJECTION INTRAMUSCULAR; INTRAVENOUS EVERY 6 HOURS PRN
Status: DISCONTINUED | OUTPATIENT
Start: 2025-05-26 | End: 2025-05-28 | Stop reason: HOSPADM

## 2025-05-26 RX ORDER — HYDROMORPHONE HYDROCHLORIDE 1 MG/ML
0.5 INJECTION, SOLUTION INTRAMUSCULAR; INTRAVENOUS; SUBCUTANEOUS EVERY 5 MIN PRN
Status: COMPLETED | OUTPATIENT
Start: 2025-05-26 | End: 2025-05-26

## 2025-05-26 RX ORDER — KETOROLAC TROMETHAMINE 30 MG/ML
INJECTION, SOLUTION INTRAMUSCULAR; INTRAVENOUS
Status: DISCONTINUED | OUTPATIENT
Start: 2025-05-26 | End: 2025-05-26 | Stop reason: SDUPTHER

## 2025-05-26 RX ORDER — POTASSIUM CHLORIDE 1500 MG/1
40 TABLET, EXTENDED RELEASE ORAL PRN
Status: DISCONTINUED | OUTPATIENT
Start: 2025-05-26 | End: 2025-05-28 | Stop reason: HOSPADM

## 2025-05-26 RX ORDER — SODIUM CHLORIDE 0.9 % (FLUSH) 0.9 %
5-40 SYRINGE (ML) INJECTION EVERY 12 HOURS SCHEDULED
Status: DISCONTINUED | OUTPATIENT
Start: 2025-05-26 | End: 2025-05-26 | Stop reason: HOSPADM

## 2025-05-26 RX ORDER — PROCHLORPERAZINE EDISYLATE 5 MG/ML
5 INJECTION INTRAMUSCULAR; INTRAVENOUS
Status: DISCONTINUED | OUTPATIENT
Start: 2025-05-26 | End: 2025-05-26 | Stop reason: HOSPADM

## 2025-05-26 RX ORDER — ENOXAPARIN SODIUM 100 MG/ML
40 INJECTION SUBCUTANEOUS DAILY
Status: DISCONTINUED | OUTPATIENT
Start: 2025-05-26 | End: 2025-05-28 | Stop reason: HOSPADM

## 2025-05-26 RX ORDER — HALOPERIDOL 5 MG/ML
1 INJECTION INTRAMUSCULAR
Status: DISCONTINUED | OUTPATIENT
Start: 2025-05-26 | End: 2025-05-26 | Stop reason: HOSPADM

## 2025-05-26 RX ORDER — MIDAZOLAM HYDROCHLORIDE 1 MG/ML
INJECTION, SOLUTION INTRAMUSCULAR; INTRAVENOUS
Status: DISCONTINUED | OUTPATIENT
Start: 2025-05-26 | End: 2025-05-26 | Stop reason: SDUPTHER

## 2025-05-26 RX ORDER — ACETAMINOPHEN 325 MG/1
650 TABLET ORAL EVERY 6 HOURS PRN
Status: DISCONTINUED | OUTPATIENT
Start: 2025-05-26 | End: 2025-05-28 | Stop reason: HOSPADM

## 2025-05-26 RX ORDER — SODIUM CHLORIDE 0.9 % (FLUSH) 0.9 %
5-40 SYRINGE (ML) INJECTION PRN
Status: DISCONTINUED | OUTPATIENT
Start: 2025-05-26 | End: 2025-05-28 | Stop reason: HOSPADM

## 2025-05-26 RX ORDER — SODIUM CHLORIDE 0.9 % (FLUSH) 0.9 %
5-40 SYRINGE (ML) INJECTION EVERY 12 HOURS SCHEDULED
Status: DISCONTINUED | OUTPATIENT
Start: 2025-05-26 | End: 2025-05-28 | Stop reason: HOSPADM

## 2025-05-26 RX ORDER — HYDRALAZINE HYDROCHLORIDE 20 MG/ML
10 INJECTION INTRAMUSCULAR; INTRAVENOUS
Status: DISCONTINUED | OUTPATIENT
Start: 2025-05-26 | End: 2025-05-26 | Stop reason: HOSPADM

## 2025-05-26 RX ORDER — POTASSIUM CHLORIDE 7.45 MG/ML
10 INJECTION INTRAVENOUS PRN
Status: DISCONTINUED | OUTPATIENT
Start: 2025-05-26 | End: 2025-05-28 | Stop reason: HOSPADM

## 2025-05-26 RX ORDER — NALOXONE HYDROCHLORIDE 0.4 MG/ML
INJECTION, SOLUTION INTRAMUSCULAR; INTRAVENOUS; SUBCUTANEOUS PRN
Status: DISCONTINUED | OUTPATIENT
Start: 2025-05-26 | End: 2025-05-26 | Stop reason: HOSPADM

## 2025-05-26 RX ORDER — PROPOFOL 10 MG/ML
INJECTION, EMULSION INTRAVENOUS
Status: DISCONTINUED | OUTPATIENT
Start: 2025-05-26 | End: 2025-05-26 | Stop reason: SDUPTHER

## 2025-05-26 RX ORDER — SODIUM CHLORIDE 9 MG/ML
INJECTION, SOLUTION INTRAVENOUS PRN
Status: DISCONTINUED | OUTPATIENT
Start: 2025-05-26 | End: 2025-05-26 | Stop reason: HOSPADM

## 2025-05-26 RX ORDER — SODIUM CHLORIDE, SODIUM LACTATE, POTASSIUM CHLORIDE, CALCIUM CHLORIDE 600; 310; 30; 20 MG/100ML; MG/100ML; MG/100ML; MG/100ML
INJECTION, SOLUTION INTRAVENOUS
Status: DISCONTINUED | OUTPATIENT
Start: 2025-05-26 | End: 2025-05-26 | Stop reason: SDUPTHER

## 2025-05-26 RX ORDER — OXYCODONE AND ACETAMINOPHEN 5; 325 MG/1; MG/1
1 TABLET ORAL EVERY 4 HOURS PRN
Refills: 0 | Status: DISCONTINUED | OUTPATIENT
Start: 2025-05-26 | End: 2025-05-28 | Stop reason: HOSPADM

## 2025-05-26 RX ORDER — ONDANSETRON 2 MG/ML
INJECTION INTRAMUSCULAR; INTRAVENOUS
Status: DISCONTINUED | OUTPATIENT
Start: 2025-05-26 | End: 2025-05-26 | Stop reason: SDUPTHER

## 2025-05-26 RX ORDER — FENTANYL CITRATE 50 UG/ML
INJECTION, SOLUTION INTRAMUSCULAR; INTRAVENOUS
Status: DISCONTINUED | OUTPATIENT
Start: 2025-05-26 | End: 2025-05-26 | Stop reason: SDUPTHER

## 2025-05-26 RX ORDER — SODIUM CHLORIDE 9 MG/ML
INJECTION, SOLUTION INTRAVENOUS PRN
Status: DISCONTINUED | OUTPATIENT
Start: 2025-05-26 | End: 2025-05-28 | Stop reason: HOSPADM

## 2025-05-26 RX ORDER — SODIUM CHLORIDE 0.9 % (FLUSH) 0.9 %
5-40 SYRINGE (ML) INJECTION PRN
Status: DISCONTINUED | OUTPATIENT
Start: 2025-05-26 | End: 2025-05-26 | Stop reason: HOSPADM

## 2025-05-26 RX ORDER — MAGNESIUM SULFATE IN WATER 40 MG/ML
2000 INJECTION, SOLUTION INTRAVENOUS PRN
Status: DISCONTINUED | OUTPATIENT
Start: 2025-05-26 | End: 2025-05-28 | Stop reason: HOSPADM

## 2025-05-26 RX ORDER — VANCOMYCIN HYDROCHLORIDE 500 MG/10ML
INJECTION, POWDER, LYOPHILIZED, FOR SOLUTION INTRAVENOUS
Status: DISCONTINUED | OUTPATIENT
Start: 2025-05-26 | End: 2025-05-26 | Stop reason: SDUPTHER

## 2025-05-26 RX ORDER — KETAMINE HYDROCHLORIDE 50 MG/ML
INJECTION, SOLUTION INTRAMUSCULAR; INTRAVENOUS
Status: DISCONTINUED | OUTPATIENT
Start: 2025-05-26 | End: 2025-05-26 | Stop reason: SDUPTHER

## 2025-05-26 RX ORDER — SCOPOLAMINE 1 MG/3D
1 PATCH, EXTENDED RELEASE TRANSDERMAL
Status: DISCONTINUED | OUTPATIENT
Start: 2025-05-26 | End: 2025-05-28 | Stop reason: HOSPADM

## 2025-05-26 RX ORDER — FENTANYL CITRATE 50 UG/ML
25 INJECTION, SOLUTION INTRAMUSCULAR; INTRAVENOUS EVERY 5 MIN PRN
Status: DISCONTINUED | OUTPATIENT
Start: 2025-05-26 | End: 2025-05-26 | Stop reason: HOSPADM

## 2025-05-26 RX ADMIN — CEFEPIME 2000 MG: 2 INJECTION, POWDER, FOR SOLUTION INTRAVENOUS at 05:37

## 2025-05-26 RX ADMIN — Medication 2250 MG: at 06:21

## 2025-05-26 RX ADMIN — HYDROMORPHONE HYDROCHLORIDE 0.5 MG: 1 INJECTION, SOLUTION INTRAMUSCULAR; INTRAVENOUS; SUBCUTANEOUS at 15:49

## 2025-05-26 RX ADMIN — VANCOMYCIN HYDROCHLORIDE 2250 MG: 500 INJECTION, POWDER, LYOPHILIZED, FOR SOLUTION INTRAVENOUS at 08:15

## 2025-05-26 RX ADMIN — HYDROMORPHONE HYDROCHLORIDE 0.5 MG: 1 INJECTION, SOLUTION INTRAMUSCULAR; INTRAVENOUS; SUBCUTANEOUS at 06:54

## 2025-05-26 RX ADMIN — KETOROLAC TROMETHAMINE 30 MG: 30 INJECTION, SOLUTION INTRAMUSCULAR at 08:23

## 2025-05-26 RX ADMIN — SODIUM CHLORIDE: 0.9 INJECTION, SOLUTION INTRAVENOUS at 14:47

## 2025-05-26 RX ADMIN — ONDANSETRON 4 MG: 2 INJECTION INTRAMUSCULAR; INTRAVENOUS at 08:21

## 2025-05-26 RX ADMIN — SODIUM CHLORIDE, SODIUM LACTATE, POTASSIUM CHLORIDE, AND CALCIUM CHLORIDE: .6; .31; .03; .02 INJECTION, SOLUTION INTRAVENOUS at 07:56

## 2025-05-26 RX ADMIN — HYDROMORPHONE HYDROCHLORIDE 0.5 MG: 1 INJECTION, SOLUTION INTRAMUSCULAR; INTRAVENOUS; SUBCUTANEOUS at 10:31

## 2025-05-26 RX ADMIN — SODIUM CHLORIDE: 0.9 INJECTION, SOLUTION INTRAVENOUS at 05:37

## 2025-05-26 RX ADMIN — SODIUM CHLORIDE 1500 MG: 0.9 INJECTION, SOLUTION INTRAVENOUS at 18:53

## 2025-05-26 RX ADMIN — OXYCODONE AND ACETAMINOPHEN 2 TABLET: 5; 325 TABLET ORAL at 18:35

## 2025-05-26 RX ADMIN — HYDROMORPHONE HYDROCHLORIDE 0.5 MG: 1 INJECTION, SOLUTION INTRAMUSCULAR; INTRAVENOUS; SUBCUTANEOUS at 20:34

## 2025-05-26 RX ADMIN — CEFEPIME 2000 MG: 2 INJECTION, POWDER, FOR SOLUTION INTRAVENOUS at 21:45

## 2025-05-26 RX ADMIN — HYDROMORPHONE HYDROCHLORIDE 0.5 MG: 1 INJECTION, SOLUTION INTRAMUSCULAR; INTRAVENOUS; SUBCUTANEOUS at 08:55

## 2025-05-26 RX ADMIN — OXYCODONE AND ACETAMINOPHEN 2 TABLET: 5; 325 TABLET ORAL at 23:10

## 2025-05-26 RX ADMIN — PROPOFOL 200 MG: 10 INJECTION, EMULSION INTRAVENOUS at 08:00

## 2025-05-26 RX ADMIN — OXYCODONE AND ACETAMINOPHEN 2 TABLET: 5; 325 TABLET ORAL at 12:43

## 2025-05-26 RX ADMIN — FENTANYL CITRATE 50 MCG: 50 INJECTION, SOLUTION INTRAMUSCULAR; INTRAVENOUS at 08:29

## 2025-05-26 RX ADMIN — CEFEPIME 2000 MG: 2 INJECTION, POWDER, FOR SOLUTION INTRAVENOUS at 14:49

## 2025-05-26 RX ADMIN — SODIUM CHLORIDE, PRESERVATIVE FREE 10 ML: 5 INJECTION INTRAVENOUS at 20:34

## 2025-05-26 RX ADMIN — LIDOCAINE HYDROCHLORIDE,EPINEPHRINE BITARTRATE 20 ML: 10; .01 INJECTION, SOLUTION INFILTRATION; PERINEURAL at 00:01

## 2025-05-26 RX ADMIN — SODIUM CHLORIDE: 0.9 INJECTION, SOLUTION INTRAVENOUS at 18:52

## 2025-05-26 RX ADMIN — HYDROMORPHONE HYDROCHLORIDE 0.5 MG: 1 INJECTION, SOLUTION INTRAMUSCULAR; INTRAVENOUS; SUBCUTANEOUS at 09:01

## 2025-05-26 RX ADMIN — FENTANYL CITRATE 50 MCG: 50 INJECTION, SOLUTION INTRAMUSCULAR; INTRAVENOUS at 08:15

## 2025-05-26 RX ADMIN — DEXAMETHASONE SODIUM PHOSPHATE 4 MG: 4 INJECTION INTRA-ARTICULAR; INTRALESIONAL; INTRAMUSCULAR; INTRAVENOUS; SOFT TISSUE at 08:16

## 2025-05-26 RX ADMIN — MIDAZOLAM HYDROCHLORIDE 2 MG: 1 INJECTION, SOLUTION INTRAMUSCULAR; INTRAVENOUS at 08:00

## 2025-05-26 RX ADMIN — KETAMINE HYDROCHLORIDE 20 MG: 50 INJECTION INTRAMUSCULAR; INTRAVENOUS at 08:29

## 2025-05-26 ASSESSMENT — PAIN SCALES - GENERAL
PAINLEVEL_OUTOF10: 2
PAINLEVEL_OUTOF10: 2
PAINLEVEL_OUTOF10: 8
PAINLEVEL_OUTOF10: 5
PAINLEVEL_OUTOF10: 3
PAINLEVEL_OUTOF10: 7
PAINLEVEL_OUTOF10: 7
PAINLEVEL_OUTOF10: 8
PAINLEVEL_OUTOF10: 7
PAINLEVEL_OUTOF10: 1

## 2025-05-26 ASSESSMENT — PAIN DESCRIPTION - ONSET
ONSET: ON-GOING

## 2025-05-26 ASSESSMENT — PAIN DESCRIPTION - FREQUENCY
FREQUENCY: INTERMITTENT
FREQUENCY: CONTINUOUS
FREQUENCY: INTERMITTENT

## 2025-05-26 ASSESSMENT — PAIN DESCRIPTION - DIRECTION
RADIATING_TOWARDS: R LEG
RADIATING_TOWARDS: R LEG

## 2025-05-26 ASSESSMENT — PAIN DESCRIPTION - LOCATION
LOCATION: LEG
LOCATION: LEG
LOCATION: KNEE
LOCATION: LEG
LOCATION: KNEE
LOCATION: LEG

## 2025-05-26 ASSESSMENT — PAIN SCALES - WONG BAKER
WONGBAKER_NUMERICALRESPONSE: NO HURT
WONGBAKER_NUMERICALRESPONSE: NO HURT

## 2025-05-26 ASSESSMENT — PAIN DESCRIPTION - ORIENTATION
ORIENTATION: RIGHT

## 2025-05-26 ASSESSMENT — PAIN DESCRIPTION - DESCRIPTORS
DESCRIPTORS: ACHING
DESCRIPTORS: ACHING;POUNDING
DESCRIPTORS: ACHING
DESCRIPTORS: ACHING;DISCOMFORT
DESCRIPTORS: SHARP
DESCRIPTORS: DISCOMFORT
DESCRIPTORS: ACHING;DISCOMFORT;POUNDING
DESCRIPTORS: ACHING
DESCRIPTORS: PRESSURE;TIGHTNESS

## 2025-05-26 ASSESSMENT — PAIN DESCRIPTION - PAIN TYPE
TYPE: SURGICAL PAIN
TYPE: SURGICAL PAIN
TYPE: ACUTE PAIN
TYPE: SURGICAL PAIN
TYPE: ACUTE PAIN

## 2025-05-26 ASSESSMENT — PAIN - FUNCTIONAL ASSESSMENT
PAIN_FUNCTIONAL_ASSESSMENT: ACTIVITIES ARE NOT PREVENTED
PAIN_FUNCTIONAL_ASSESSMENT: PREVENTS OR INTERFERES SOME ACTIVE ACTIVITIES AND ADLS
PAIN_FUNCTIONAL_ASSESSMENT: ACTIVITIES ARE NOT PREVENTED
PAIN_FUNCTIONAL_ASSESSMENT: PREVENTS OR INTERFERES SOME ACTIVE ACTIVITIES AND ADLS

## 2025-05-26 NOTE — ED PROVIDER NOTES
ED Attending Attestation Note     Date of evaluation: 5/25/2025    This patient was seen by the resident.  I have seen and examined the patient, agree with the workup, evaluation, management and diagnosis. The care plan has been discussed.  My assessment reveals significantly decreased range of motion of the right knee, no overlying erythema or warmth but palpable effusion.  Present for arthrocentesis.     Leena Salmeron MD  05/26/25 0120

## 2025-05-26 NOTE — BRIEF OP NOTE
Brief Postoperative Note      Patient: Willow Carson  YOB: 1995  MRN: [id number]    Date of Procedure: 5/26/2025    Pre-Op Diagnosis Codes:      * Pyogenic arthritis of right knee joint, due to unspecified organism (HCC) [M00.9]    Post-Op Diagnosis: Same       Procedure(s):  RIGHT KNEE ARTHROTOMY    Surgeon(s):  Jose A Clemente MD    Assistant:  * No surgical staff found *    Anesthesia: General    Estimated Blood Loss (mL): Minimal    Complications: None    Specimens:   ID Type Source Tests Collected by Time Destination   1 : RIGHT KNEE Tissue Tissue CULTURE, TISSUE (WITH GRAM STAIN), CULTURE, ANAEROBIC AND AEROBIC Jose A Clemente MD 5/26/2025 0817    2 : SYNOVIAL FLUID RIGHT KNEE Body Fluid Fluid CELL COUNT WITH DIFFERENTIAL, BODY FLUID, CULTURE, BODY FLUID (WITH GRAM STAIN), CRYSTALS, BODY FLUID, CULTURE, ANAEROBIC AND AEROBIC Jose A Clemente MD 5/26/2025 0825        Implants:  * No implants in log *      Drains:   Closed/Suction Drain Right Knee Accordion (Active)   Site Description Clean, dry & intact 05/26/25 0835   Dressing Status New dressing applied 05/26/25 0835   Drain Status Compressed 05/26/25 0835       Findings:  Infection Present At Time Of Surgery (PATOS) (choose all levels that have infection present):  - Deep Infection (muscle/fascia) present as evidenced by fluid consistent with infection  Other Findings: Same.    Electronically signed by Jose A Clemente MD on 5/26/2025 at 8:46 AM   Abdominal Pain, N/V/D

## 2025-05-26 NOTE — ANESTHESIA PRE PROCEDURE
Current packs/day: 0.50     Types: Cigarettes   • Smokeless tobacco: Never   Substance Use Topics   • Alcohol use: No                                Ready to quit: Not Answered  Counseling given: Not Answered      Vital Signs (Current):   Vitals:    05/26/25 0123 05/26/25 0403 05/26/25 0512 05/26/25 0654   BP: 115/76 118/70 (!) 103/59    Pulse: 78 72 82    Resp: 18 20 18 16   Temp:  98.2 °F (36.8 °C) 98.3 °F (36.8 °C)    TempSrc:  Oral Oral    SpO2: 100% 99% 95%    Weight:       Height:                                                  BP Readings from Last 3 Encounters:   05/26/25 (!) 103/59   06/30/23 101/65   05/23/19 104/78       NPO Status:                                                                                 BMI:   Wt Readings from Last 3 Encounters:   05/25/25 86.6 kg (191 lb)   06/30/23 106.6 kg (235 lb)   05/23/19 104.9 kg (231 lb 3.2 oz)     Body mass index is 32.79 kg/m².    CBC:   Lab Results   Component Value Date/Time    WBC 10.5 05/25/2025 11:45 PM    RBC 3.99 05/25/2025 11:45 PM    HGB 12.6 05/25/2025 11:45 PM    HCT 36.6 05/25/2025 11:45 PM    MCV 91.8 05/25/2025 11:45 PM    RDW 13.8 05/25/2025 11:45 PM     05/25/2025 11:45 PM       CMP:   Lab Results   Component Value Date/Time     05/25/2025 11:45 PM    K 4.0 05/25/2025 11:45 PM     05/25/2025 11:45 PM    CO2 23 05/25/2025 11:45 PM    BUN 14 05/25/2025 11:45 PM    CREATININE 0.7 05/25/2025 11:45 PM    GFRAA >60 07/03/2017 06:50 PM    GFRAA >60 12/01/2011 08:50 AM    AGRATIO 1.7 07/03/2017 06:50 PM    LABGLOM >90 05/25/2025 11:45 PM    GLUCOSE 98 05/25/2025 11:45 PM    CALCIUM 8.9 05/25/2025 11:45 PM    BILITOT 0.6 07/03/2017 06:50 PM    ALKPHOS 66 07/03/2017 06:50 PM    AST 14 07/03/2017 06:50 PM    ALT 9 07/03/2017 06:50 PM       POC Tests: No results for input(s): \"POCGLU\", \"POCNA\", \"POCK\", \"POCCL\", \"POCBUN\", \"POCHEMO\", \"POCHCT\" in the last 72 hours.    Coags: No results found for: \"PROTIME\", \"INR\", \"APTT\"    HCG

## 2025-05-26 NOTE — CONSULTS
Infectious Diseases   Consult Note      Reason for Consult: Right knee septic arthritis  Requesting Physician: Dr. Foreman   Date of Admission: 5/25/2025  Subjective:   CHIEF COMPLAINT: right knee pain     HPI:  30-year-old female with history of asthma, obesity with BMI of 33, depression and anxiety that presented on 5/25 with complaints of right knee pain.  Patient states onset of acute right knee pain about 4 days prior to presentation.  States she has been having progressive swelling and redness around this joint.  She does not recall any antecedent trauma or injury to this area.  She did recently have a right shoulder area tattoo that was done but denies any issues with healing or infection after this.  She also had a recent root canal done on the right lower molar and just had her crown fitted.  She states that she does have several other cavities.  She states the dentist did not give her any oral antibiotics for the root canal.   Currently, denies any associated fevers or chills.  She has been having increasing difficulty in bearing weight on this knee resulting in her presentation.  Upon presentation, WBC was 10.5 and she was afebrile.  ESR/CRP was 17/38.2 respectively.  She was empirically started on vancomycin, cefepime.  A synovial fluid obtained from the right knee on 5/26 is showing 1+ GPC on Gram stain however no growth to date.  The differentials on that synovial fluid included 31,466 WBCs with 82% neutrophils.  No crystals were seen.  A right knee x-ray showed large knee joint effusion without any acute osseous abnormalities.  Orthopedics was consulted and the patient is status post OR with  on 5/26 for right knee arthrotomy and incision and drainage for septic knee.  IntraOp findings of large amount of cloudy fluid with pyogenic arthritis encountered.    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 She is sexually active with males only, has one new partner in the last month and states that

## 2025-05-26 NOTE — ANESTHESIA PROCEDURE NOTES
Airway  Date/Time: 5/26/2025 8:00 AM  Urgency: elective    Airway not difficult    General Information and Staff    Patient location during procedure: OR  Anesthesiologist: Juan Ortiz MD  Performed by: Juan Ortiz MD  Authorized by: Juan Ortiz MD      Indications and Patient Condition  Indications for airway management: anesthesia and airway protection  Spontaneous ventilation: present  Sedation level: deep  Preoxygenated: yes  Mask difficulty assessment: not attempted    Final Airway Details  Final airway type: supraglottic airway      Successful airway: Size 4     Number of attempts at approach: 1  Ventilation between attempts: bag mask    no

## 2025-05-26 NOTE — CONSULTS
[facility] Orthopedic Surgery  Consult Note         This patient is seen in consultation at the request of Gregorio Still MD     Reason for Consult:  Right knee pain/ septic arthritis.    CHIEF COMPLAINT:  Right knee pain/ septic arthritis.    History Obtained From:  patient, electronic medical record    HISTORY OF PRESENT ILLNESS:    Ms. Carson 30 y.o.  female seen today at [facility] for evaluation of right knee pain which started Wed last week with no trauma. She came to [facility] ED 5/25/2025, had right knee aspiration that showed GPC. She is complaining of sharp pain.  Pain is increase with standing and walking and decrease with rest. Pain is sharp early in the morning with first few steps, dull achy pain by the end of the day. Alleviating factors: rest. No radiation and no numbness and tingling sensation. No other complaint.  No h/o trauma or gout.    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 No pertinent surgical history.    Medications prior to admission:   Prior to Admission medications    Medication Sig Start Date End Date Taking? Authorizing Provider   albuterol sulfate HFA (PROAIR HFA) 108 (90 Base) MCG/ACT inhaler INHALE 2 PUFFS EVERY 6 HOURS AS NEEDED FOR WHEEZING OR SHORTNESS OF BREATH 8/23/17  Yes Sadaf Plaza MD   albuterol sulfate HFA (VENTOLIN HFA) 108 (90 Base) MCG/ACT inhaler Inhale 2 puffs into the lungs every 6 hours as needed for Wheezing 7/30/18   Sadaf Plaza MD       Current Medications:   Current Facility-Administered Medications: albuterol (PROVENTIL) (2.5 MG/3ML) 0.083% nebulizer solution 2.5 mg, 2.5 mg, Nebulization, Q4H PRN  sodium chloride flush 0.9 % injection 5-40 mL, 5-40 mL, IntraVENous, 2 times per day  sodium chloride flush 0.9 % injection 5-40 mL, 5-40 mL, IntraVENous, PRN  0.9 % sodium chloride infusion, , IntraVENous, PRN  potassium chloride (KLOR-CON

## 2025-05-26 NOTE — ANESTHESIA POSTPROCEDURE EVALUATION
Department of Anesthesiology  Postprocedure Note    Patient: Willow Carson  MRN: [id number]  YOB: 1995  Date of evaluation: 5/26/2025    Procedure Summary       Date: 05/26/25 Room / Location: [address]    Anesthesia Start: 0756 Anesthesia Stop:     Procedure: RIGHT KNEE ARTHROTOMY (Right: Knee) Diagnosis:       Pyogenic arthritis of right knee joint, due to unspecified organism (HCC)      (Pyogenic arthritis of right knee joint, due to unspecified organism (HCC) [M00.9])    Surgeons: Jose A Clemente MD Responsible Provider: Juan Ortiz MD    Anesthesia Type: general ASA Status: 2            Anesthesia Type: No value filed.    Alan Phase I: Alan Score: 10    Alan Phase II:      Anesthesia Post Evaluation    Patient location during evaluation: PACU  Patient participation: complete - patient participated  Level of consciousness: awake  Pain score: 2  Airway patency: patent  Cardiovascular status: blood pressure returned to baseline  Respiratory status: acceptable  Hydration status: euvolemic  Pain management: adequate    No notable events documented.

## 2025-05-26 NOTE — CONSULTS
[facility] -  Clinical Pharmacy Note    Vancomycin - Management by Pharmacy    Consult Date(s): 05/26/2025  Consulting Provider(s): Dr. Romero    Assessment / Plan  Septic arthritis of right knee- Vancomycin  Concurrent Antimicrobials: Cefepime  Day of Vanc Therapy / Ordered Duration: Day 1 of TBD  Current Dosing Method: Bayesian-Guided AUC Dosing  Therapeutic Goal: -600 mg/L*hr  Current Dose / Plan:   Vancomycin 2250 mg IV (~ 25 mg/kg) x1  Will follow with Vancomycin 1500 mg IV Q12H  Estimated AUCss ~ 521 mg/L*hr and troughss ~ 13.2 mg/L  Will continue to monitor clinical condition and make adjustments to regimen as appropriate.    Thank you for consulting pharmacy,      Winifred Coleman, PharmD  [id number] (Main Pharmacy)        Interval update:  therapy initiation     Subjective/Objective:   Willow Carson is a 30 y.o. female with a PMHx significant for depression who is admitted with septic arthritis.     Pharmacy is consulted to dose Vancomycin.    Ht Readings from Last 1 Encounters:   05/25/25 1.626 m (5' 4\")     Wt Readings from Last 1 Encounters:   05/25/25 86.6 kg (191 lb)     Current & Prior Antimicrobial Regimen(s):  Cefepime (05/26- Current)  Vancomycin- Pharmacy to dose  2250 mg IV x1 (0526)  1500 mg IV Q12H (05/26- Current)    Vancomycin Level(s) / Doses:    Date Time Dose Type of Level / Level Interpretation                 Note: Serum levels collected for AUC-based dosing may be high if collected in close proximity to the dose administered. This is not necessarily indicative of toxicity.    Cultures & Sensitivities:    Date Site Micro Susceptibility / Result   05/25 Gram Strain of Synovial fluid Sent    05/25 Body fluid Cx of synovial fluid Gram positive cocci      Recent Labs     05/25/25  2345   CREATININE 0.7   BUN 14   WBC 10.5       Estimated Creatinine Clearance: 123 mL/min (based on SCr of 0.7 mg/dL).    Additional Lab Values / Findings of Note:    No results for input(s):

## 2025-05-26 NOTE — PLAN OF CARE
Problem: Discharge Planning  Goal: Discharge to home or other facility with appropriate resources  Outcome: Progressing     Problem: Pain  Goal: Verbalizes/displays adequate comfort level or baseline comfort level  5/26/2025 1727 by Sonja Gordon RN  Outcome: Progressing     Problem: Respiratory - Adult  Goal: Achieves optimal ventilation and oxygenation  5/26/2025 1727 by Sonja Gordon RN  Outcome: Progressing     Problem: Cardiovascular - Adult  Goal: Absence of cardiac dysrhythmias or at baseline  5/26/2025 1727 by Sonja Gordon RN  Outcome: Progressing     Problem: Safety - Adult  Goal: Free from fall injury  5/26/2025 1727 by Sonja Gordon RN  Outcome: Progressing

## 2025-05-26 NOTE — PLAN OF CARE
Problem: Safety - Adult  Goal: Free from fall injury  Outcome: Progressing  Note:  Remains free from falls, bed in low position, call light in reach, ambulates with crutches, calls appropriately for assistance.     Problem: Pain  Goal: Verbalizes/displays adequate comfort level or baseline comfort level  Outcome: Progressing  Note:  PRN Dilaudid 0.5 mg IV at 0654 for c/o 7/10 right knee pain.     Problem: Respiratory - Adult  Goal: Achieves optimal ventilation and oxygenation  Outcome: Progressing  Note:  O2 95% on Room Air.     Problem: Cardiovascular - Adult  Goal: Absence of cardiac dysrhythmias or at baseline  Outcome: Progressing  Note:  Normal Sinus Rhythm rate 81 at 0518.

## 2025-05-26 NOTE — H&P
[facility] Medicine History & Physical      Date of Admission: 5/25/2025    Date of Service:  Pt seen/examined on 05/26/25     [x]Admitted to Inpatient with expected LOS greater than two midnights due to medical therapy.  []Placed in Observation status.    Chief Admission Complaint: Right knee pain    Presenting Admission History:      30 y.o. female with PMHx significant for asthma, obesity, depression and anxiety who presented to ED with a complaint of right knee pain.  Patient states that she started having right knee pain about 4 days ago.  Patient states that the pain has been progressive and associated with swelling and redness of the joint.  Patient does not recall any preceding trauma or injury to the left patient denies any associated fever or chills.  Patient started having difficulty bearing weight on her knees and has been having difficulty ambulating.  Patient has been using crutches to get around.  Patient went to an urgent care who gave her referral to an orthopedic physician.  Patient decided to come to ED for further evaluation.  In ED patient underwent arthrocentesis with fluids study suggestive of possible septic arthritis.  Patient denies any SCDs.  Patient is currently being admitted under inpatient status for further management.      ROS:     Review of 10 systems is negative except what is outlined in HPI.     Assessment:  Septic arthritis of the right knee.  Asthma without acute exacerbation.  Morbid obesity, Body mass index is 32.79 kg/m².   Tobacco use disorder.  Chronic depression/generalized anxiety disorder    Plan:    Patient is admitted under inpatient status.  Patient underwent arthrocentesis of the right knee with findings suggestive of possible septic arthritis.  Follow-up fluid cultures.  In the meanwhile start the patient on IV vancomycin and cefepime.  Infectious disease consult.  Orthopedic has been consulted and plan is to proceed to the OR for I&D of the right knee.  Keep

## 2025-05-26 NOTE — ED PROVIDER NOTES
THE [facility]  EMERGENCY DEPARTMENT ENCOUNTER          EM RESIDENT NOTE       Date of evaluation: 5/25/2025    Chief Complaint     Knee Pain (Patient c/o right knee pain and swelling, started on Wednesday evening with some slight discomfort, no known injury at that time. Worked all day Friday on feet, by Saturday unable to put much weight on it and went to Urgent Care, Ortho referral given, patient putting ice and elevating, ibuprofen with no relief )      History of Present Illness     Willow Carson is a 30 y.o. female who presents atraumatic right knee pain.  Patient states 3 days ago she started developing pain in her right knee.  It is progressively worsened.  She was unable to bear weight on the right lower extremity yesterday.  She now is ambulating using crutches.  She cannot range at the knee.  Denies any trauma to the knee.  She has been taking ibuprofen without relief.  She was evaluated at an urgent care yesterday and given a referral to orthopedic surgery.  She has no history of similar in the past.  No history of gout, autoimmune disease, cancer, IV drug use, and no concerns for STI or any vaginal symptoms currently.  No fevers.      MEDICAL DECISION MAKING / ASSESSMENT / PLAN     INITIAL VITALS: BP: (!) 144/78, Temp: 97.8 °F (36.6 °C), Pulse: 85, Respirations: 16, SpO2: 98 %    Willow Carson is a 30 y.o. female with history and physical as noted elsewhere in this document.  On initial presentation, patient is hemodynamically stable, in no acute distress, afebrile.  Overall is very well-appearing, unable to range her right knee passively.  She does have effusion evident on exam.  With this monoarticular arthralgia and effusion, will need evaluation for possible septic joint.  Will give Dilaudid for pain.  Lab work and XR ordered.  Plan for arthrocentesis..    CRP mildly elevated, lab work otherwise unremarkable.  Arthrocentesis is performed with cloudy fluid.  Sent for analysis.

## 2025-05-26 NOTE — FLOWSHEET NOTE
Report called to 6 [facility] RN by Shayla SILVEIRA. Pt mother called and updated on pt status.

## 2025-05-26 NOTE — ED NOTES
Patient Name: Willow Carson  : 1995 30 y.o.  MRN: [id number]  ED Room #: A02/A02-02A     Chief complaint:   Chief Complaint   Patient presents with    Knee Pain     Patient c/o right knee pain and swelling, started on Wednesday evening with some slight discomfort, no known injury at that time. Worked all day Friday on feet, by Saturday unable to put much weight on it and went to Urgent Care, Ortho referral given, patient putting ice and elevating, ibuprofen with no relief      Hospital Problem/Diagnosis:   Hospital Problems           Last Modified POA    * (Principal) Septic arthritis (HCC) 2025 Yes         O2 Flow Rate:O2 Device: None (Room air)   (if applicable)  Cardiac Rhythm:   (if applicable)  Active LDA's:   Peripheral IV 25 Right Antecubital (Active)   Site Assessment Clean, dry & intact 25   Line Status Blood return noted;Normal saline locked;Specimen collected 25   Dressing Status New dressing applied 25            How does patient ambulate?  Has been using crutches due to increased pain after being on her feet Friday and Saturday     2. How does patient take pills? Whole with Water    3. Is patient alert? Alert    4. Is patient oriented? To Person, To Place, To Time, To Situation, and Follows Commands    5.   Patient arrived from:  home  Facility Name: ___________________________________________    6. If patient is disoriented or from a Skill Nursing Facility has family been notified of admission? No    7. Patient belongings? Belongings: Cell Phone, Wallet, Clothing, and crutches    Disposition of belongings? Kept with Patient     8. Any specific patient or family belongings/needs/dynamics?   a. Fluid drained from knee in ED.     9. Miscellaneous comments/pending orders?  a. Ortho to evaluate pt this morning for plan of care.      If there are any additional questions please reach out to the Emergency Department.      Roxanna Moya RN  25

## 2025-05-27 LAB
ANION GAP SERPL CALCULATED.3IONS-SCNC: 9 MMOL/L (ref 3–16)
BASOPHILS # BLD: 0 K/UL (ref 0–0.2)
BASOPHILS NFR BLD: 0.3 %
BUN SERPL-MCNC: 11 MG/DL (ref 7–20)
CALCIUM SERPL-MCNC: 8.6 MG/DL (ref 8.3–10.6)
CHLORIDE SERPL-SCNC: 106 MMOL/L (ref 99–110)
CO2 SERPL-SCNC: 23 MMOL/L (ref 21–32)
CREAT SERPL-MCNC: 0.7 MG/DL (ref 0.6–1.1)
DEPRECATED RDW RBC AUTO: 13.3 % (ref 12.4–15.4)
EOSINOPHIL # BLD: 0.1 K/UL (ref 0–0.6)
EOSINOPHIL NFR BLD: 1.2 %
EST. AVERAGE GLUCOSE BLD GHB EST-MCNC: 88.2 MG/DL
GFR SERPLBLD CREATININE-BSD FMLA CKD-EPI: >90 ML/MIN/{1.73_M2}
GLUCOSE SERPL-MCNC: 102 MG/DL (ref 70–99)
HBA1C MFR BLD: 4.7 %
HCT VFR BLD AUTO: 35.1 % (ref 36–48)
HGB BLD-MCNC: 11.8 G/DL (ref 12–16)
HIV 1+2 AB+HIV1 P24 AG SERPL QL IA: NORMAL
HIV 2 AB SERPL QL IA: NORMAL
HIV1 AB SERPL QL IA: NORMAL
HIV1 P24 AG SERPL QL IA: NORMAL
LYMPHOCYTES # BLD: 2.1 K/UL (ref 1–5.1)
LYMPHOCYTES NFR BLD: 16.9 %
MCH RBC QN AUTO: 31 PG (ref 26–34)
MCHC RBC AUTO-ENTMCNC: 33.6 G/DL (ref 31–36)
MCV RBC AUTO: 92.2 FL (ref 80–100)
MONOCYTES # BLD: 0.7 K/UL (ref 0–1.3)
MONOCYTES NFR BLD: 5.5 %
NEUTROPHILS # BLD: 9.3 K/UL (ref 1.7–7.7)
NEUTROPHILS NFR BLD: 76.1 %
PLATELET # BLD AUTO: 210 K/UL (ref 135–450)
PMV BLD AUTO: 9.2 FL (ref 5–10.5)
POTASSIUM SERPL-SCNC: 3.8 MMOL/L (ref 3.5–5.1)
RBC # BLD AUTO: 3.81 M/UL (ref 4–5.2)
SODIUM SERPL-SCNC: 138 MMOL/L (ref 136–145)
WBC # BLD AUTO: 12.2 K/UL (ref 4–11)

## 2025-05-27 PROCEDURE — 1200000000 HC SEMI PRIVATE

## 2025-05-27 PROCEDURE — 2500000003 HC RX 250 WO HCPCS: Performed by: INTERNAL MEDICINE

## 2025-05-27 PROCEDURE — 99024 POSTOP FOLLOW-UP VISIT: CPT | Performed by: PHYSICIAN ASSISTANT

## 2025-05-27 PROCEDURE — 6370000000 HC RX 637 (ALT 250 FOR IP): Performed by: HOSPITALIST

## 2025-05-27 PROCEDURE — 6360000002 HC RX W HCPCS: Performed by: INTERNAL MEDICINE

## 2025-05-27 PROCEDURE — 99233 SBSQ HOSP IP/OBS HIGH 50: CPT | Performed by: INTERNAL MEDICINE

## 2025-05-27 PROCEDURE — 36415 COLL VENOUS BLD VENIPUNCTURE: CPT

## 2025-05-27 PROCEDURE — 2580000003 HC RX 258: Performed by: INTERNAL MEDICINE

## 2025-05-27 PROCEDURE — 80048 BASIC METABOLIC PNL TOTAL CA: CPT

## 2025-05-27 PROCEDURE — 85025 COMPLETE CBC W/AUTO DIFF WBC: CPT

## 2025-05-27 RX ORDER — SODIUM CHLORIDE 9 MG/ML
INJECTION, SOLUTION INTRAVENOUS PRN
Status: DISCONTINUED | OUTPATIENT
Start: 2025-05-27 | End: 2025-05-28 | Stop reason: HOSPADM

## 2025-05-27 RX ORDER — LIDOCAINE HYDROCHLORIDE 10 MG/ML
50 INJECTION, SOLUTION EPIDURAL; INFILTRATION; INTRACAUDAL; PERINEURAL ONCE
Status: COMPLETED | OUTPATIENT
Start: 2025-05-27 | End: 2025-05-28

## 2025-05-27 RX ORDER — SODIUM CHLORIDE 0.9 % (FLUSH) 0.9 %
5-40 SYRINGE (ML) INJECTION PRN
Status: DISCONTINUED | OUTPATIENT
Start: 2025-05-27 | End: 2025-05-28 | Stop reason: HOSPADM

## 2025-05-27 RX ORDER — SODIUM CHLORIDE 0.9 % (FLUSH) 0.9 %
5-40 SYRINGE (ML) INJECTION EVERY 12 HOURS SCHEDULED
Status: DISCONTINUED | OUTPATIENT
Start: 2025-05-27 | End: 2025-05-28 | Stop reason: HOSPADM

## 2025-05-27 RX ADMIN — SODIUM CHLORIDE, PRESERVATIVE FREE 10 ML: 5 INJECTION INTRAVENOUS at 21:02

## 2025-05-27 RX ADMIN — HYDROMORPHONE HYDROCHLORIDE 0.5 MG: 1 INJECTION, SOLUTION INTRAMUSCULAR; INTRAVENOUS; SUBCUTANEOUS at 00:56

## 2025-05-27 RX ADMIN — HYDROMORPHONE HYDROCHLORIDE 0.5 MG: 1 INJECTION, SOLUTION INTRAMUSCULAR; INTRAVENOUS; SUBCUTANEOUS at 13:19

## 2025-05-27 RX ADMIN — SODIUM CHLORIDE 1500 MG: 0.9 INJECTION, SOLUTION INTRAVENOUS at 09:19

## 2025-05-27 RX ADMIN — SODIUM CHLORIDE: 0.9 INJECTION, SOLUTION INTRAVENOUS at 17:52

## 2025-05-27 RX ADMIN — CEFEPIME 2000 MG: 2 INJECTION, POWDER, FOR SOLUTION INTRAVENOUS at 05:00

## 2025-05-27 RX ADMIN — SODIUM CHLORIDE, PRESERVATIVE FREE 10 ML: 5 INJECTION INTRAVENOUS at 09:07

## 2025-05-27 RX ADMIN — SODIUM CHLORIDE 1250 MG: 0.9 INJECTION, SOLUTION INTRAVENOUS at 17:53

## 2025-05-27 RX ADMIN — OXYCODONE AND ACETAMINOPHEN 2 TABLET: 5; 325 TABLET ORAL at 11:48

## 2025-05-27 RX ADMIN — HYDROMORPHONE HYDROCHLORIDE 0.5 MG: 1 INJECTION, SOLUTION INTRAMUSCULAR; INTRAVENOUS; SUBCUTANEOUS at 09:06

## 2025-05-27 RX ADMIN — OXYCODONE AND ACETAMINOPHEN 2 TABLET: 5; 325 TABLET ORAL at 16:01

## 2025-05-27 RX ADMIN — HYDROMORPHONE HYDROCHLORIDE 0.5 MG: 1 INJECTION, SOLUTION INTRAMUSCULAR; INTRAVENOUS; SUBCUTANEOUS at 23:05

## 2025-05-27 RX ADMIN — CEFEPIME 2000 MG: 2 INJECTION, POWDER, FOR SOLUTION INTRAVENOUS at 21:05

## 2025-05-27 RX ADMIN — OXYCODONE AND ACETAMINOPHEN 2 TABLET: 5; 325 TABLET ORAL at 04:53

## 2025-05-27 RX ADMIN — HYDROMORPHONE HYDROCHLORIDE 0.5 MG: 1 INJECTION, SOLUTION INTRAMUSCULAR; INTRAVENOUS; SUBCUTANEOUS at 17:44

## 2025-05-27 RX ADMIN — CEFEPIME 2000 MG: 2 INJECTION, POWDER, FOR SOLUTION INTRAVENOUS at 13:26

## 2025-05-27 RX ADMIN — SODIUM CHLORIDE, PRESERVATIVE FREE 10 ML: 5 INJECTION INTRAVENOUS at 21:01

## 2025-05-27 RX ADMIN — SODIUM CHLORIDE: 0.9 INJECTION, SOLUTION INTRAVENOUS at 09:18

## 2025-05-27 RX ADMIN — OXYCODONE AND ACETAMINOPHEN 2 TABLET: 5; 325 TABLET ORAL at 21:00

## 2025-05-27 RX ADMIN — SODIUM CHLORIDE: 0.9 INJECTION, SOLUTION INTRAVENOUS at 13:26

## 2025-05-27 RX ADMIN — ENOXAPARIN SODIUM 40 MG: 100 INJECTION SUBCUTANEOUS at 09:20

## 2025-05-27 ASSESSMENT — PAIN SCALES - GENERAL
PAINLEVEL_OUTOF10: 8
PAINLEVEL_OUTOF10: 0
PAINLEVEL_OUTOF10: 7
PAINLEVEL_OUTOF10: 8
PAINLEVEL_OUTOF10: 0
PAINLEVEL_OUTOF10: 8
PAINLEVEL_OUTOF10: 7
PAINLEVEL_OUTOF10: 0
PAINLEVEL_OUTOF10: 1
PAINLEVEL_OUTOF10: 2
PAINLEVEL_OUTOF10: 7
PAINLEVEL_OUTOF10: 2
PAINLEVEL_OUTOF10: 7

## 2025-05-27 ASSESSMENT — PAIN DESCRIPTION - DIRECTION
RADIATING_TOWARDS: NO
RADIATING_TOWARDS: R LEG

## 2025-05-27 ASSESSMENT — PAIN DESCRIPTION - LOCATION
LOCATION: KNEE
LOCATION: KNEE;LEG
LOCATION: KNEE

## 2025-05-27 ASSESSMENT — PAIN DESCRIPTION - PAIN TYPE
TYPE: SURGICAL PAIN

## 2025-05-27 ASSESSMENT — PAIN SCALES - WONG BAKER
WONGBAKER_NUMERICALRESPONSE: NO HURT
WONGBAKER_NUMERICALRESPONSE: NO HURT

## 2025-05-27 ASSESSMENT — PAIN DESCRIPTION - FREQUENCY
FREQUENCY: INTERMITTENT

## 2025-05-27 ASSESSMENT — PAIN DESCRIPTION - DESCRIPTORS
DESCRIPTORS: ACHING;DISCOMFORT
DESCRIPTORS: ACHING
DESCRIPTORS: ACHING;DISCOMFORT
DESCRIPTORS: ACHING
DESCRIPTORS: ACHING;DISCOMFORT

## 2025-05-27 ASSESSMENT — PAIN DESCRIPTION - ORIENTATION
ORIENTATION: RIGHT

## 2025-05-27 ASSESSMENT — PAIN DESCRIPTION - ONSET
ONSET: GRADUAL
ONSET: ON-GOING
ONSET: ON-GOING

## 2025-05-27 NOTE — PLAN OF CARE
Problem: Discharge Planning  Goal: Discharge to home or other facility with appropriate resources  Outcome: Progressing     Problem: Pain  Goal: Verbalizes/displays adequate comfort level or baseline comfort level  Outcome: Progressing     Problem: Respiratory - Adult  Goal: Achieves optimal ventilation and oxygenation  Outcome: Progressing     Problem: Cardiovascular - Adult  Goal: Absence of cardiac dysrhythmias or at baseline  Outcome: Progressing     Problem: Safety - Adult  Goal: Free from fall injury  Outcome: Progressing

## 2025-05-27 NOTE — DISCHARGE INSTR - COC
Continuity of Care Form    Patient Name: Willow Carson   :  1995  MRN:  [id number]    Admit date:  2025  Discharge date:  ***    Code Status Order: Full Code   Advance Directives:     Admitting Physician:  Anupama Romero MD  PCP: Crystal, Pcp    Discharging Nurse: ***  Discharging Hospital Unit/Room#: 6307/6307-01  Discharging Unit Phone Number: ***    Emergency Contact:   Extended Emergency Contact Information  Primary Emergency Contact: Jennifer Carson  Address: [address] [place] [address]  Home Phone: [phone]  Mobile Phone: [phone]  Relation: Parent  Secondary Emergency Contact: Elaina Salinas   [facility]  Home Phone: [phone]  Relation: Grandparent    Past Surgical History:  Past Surgical History:   Procedure Laterality Date    KNEE ARTHROTOMY Right 2025    RIGHT KNEE ARTHROTOMY performed by Jose A Clemente MD at [facility] OR       Immunization History:   Immunization History   Administered Date(s) Administered    HPV Quadrivalent (Gardasil) 2009, 2017    Influenza 2011    MMR, PRIORIX, M-M-R II, (age 12m+), SC, 0.5mL 2007    PPD Test 2011    Pneumococcal, PPSV23, PNEUMOVAX 23, (age 2y+), SC/IM, 0.5mL 2017    TDaP, ADACEL (age 10y-64y), BOOSTRIX (age 10y+), IM, 0.5mL 2007, 2011, 2019       Active Problems:  Patient Active Problem List   Diagnosis Code    Obesity E66.9    Current smoker F17.200    Bronchitis J40    Otitis media, right H66.91    Fatigue R53.83    Insomnia G47.00    Depression with anxiety F41.8    Non morbid obesity due to excess calories E66.09    Septic arthritis (HCC) M00.9    Obesity (BMI 30.0-34.9) E66.811       Isolation/Infection:   Isolation            No Isolation          Patient Infection Status    None to display         Nurse Assessment:  Last Vital Signs: /73   Pulse 92   Temp 97.4 °F (36.3 °C) (Oral)   Resp 18   Ht 1.626 m (5' 4\")

## 2025-05-28 VITALS
DIASTOLIC BLOOD PRESSURE: 78 MMHG | HEIGHT: 64 IN | HEART RATE: 85 BPM | BODY MASS INDEX: 32.61 KG/M2 | RESPIRATION RATE: 16 BRPM | TEMPERATURE: 97.9 F | WEIGHT: 191 LBS | SYSTOLIC BLOOD PRESSURE: 134 MMHG | OXYGEN SATURATION: 99 %

## 2025-05-28 LAB
ANION GAP SERPL CALCULATED.3IONS-SCNC: 9 MMOL/L (ref 3–16)
B BURGDOR.VLSE1+PEPC10 AB SER IA-ACNC: 0.22 IV
BASOPHILS # BLD: 0 K/UL (ref 0–0.2)
BASOPHILS NFR BLD: 0.5 %
BUN SERPL-MCNC: 13 MG/DL (ref 7–20)
C TRACH DNA UR QL NAA+PROBE: NEGATIVE
CALCIUM SERPL-MCNC: 8.3 MG/DL (ref 8.3–10.6)
CHLORIDE SERPL-SCNC: 109 MMOL/L (ref 99–110)
CK SERPL-CCNC: 26 U/L (ref 26–192)
CO2 SERPL-SCNC: 22 MMOL/L (ref 21–32)
CREAT SERPL-MCNC: 0.8 MG/DL (ref 0.6–1.1)
DEPRECATED RDW RBC AUTO: 13.4 % (ref 12.4–15.4)
EOSINOPHIL # BLD: 0.3 K/UL (ref 0–0.6)
EOSINOPHIL NFR BLD: 3.8 %
GFR SERPLBLD CREATININE-BSD FMLA CKD-EPI: >90 ML/MIN/{1.73_M2}
GLUCOSE SERPL-MCNC: 92 MG/DL (ref 70–99)
HCT VFR BLD AUTO: 34.1 % (ref 36–48)
HGB BLD-MCNC: 11.6 G/DL (ref 12–16)
LYMPHOCYTES # BLD: 2.4 K/UL (ref 1–5.1)
LYMPHOCYTES NFR BLD: 30.2 %
MAGNESIUM SERPL-MCNC: 1.63 MG/DL (ref 1.8–2.4)
MCH RBC QN AUTO: 31.3 PG (ref 26–34)
MCHC RBC AUTO-ENTMCNC: 34 G/DL (ref 31–36)
MCV RBC AUTO: 92.2 FL (ref 80–100)
MONOCYTES # BLD: 0.6 K/UL (ref 0–1.3)
MONOCYTES NFR BLD: 7.5 %
N GONORRHOEA DNA UR QL NAA+PROBE: NEGATIVE
NEUTROPHILS # BLD: 4.7 K/UL (ref 1.7–7.7)
NEUTROPHILS NFR BLD: 58 %
PLATELET # BLD AUTO: 208 K/UL (ref 135–450)
PMV BLD AUTO: 9.2 FL (ref 5–10.5)
POTASSIUM SERPL-SCNC: 3.5 MMOL/L (ref 3.5–5.1)
RBC # BLD AUTO: 3.69 M/UL (ref 4–5.2)
SODIUM SERPL-SCNC: 140 MMOL/L (ref 136–145)
WBC # BLD AUTO: 8.1 K/UL (ref 4–11)

## 2025-05-28 PROCEDURE — 2500000003 HC RX 250 WO HCPCS: Performed by: INTERNAL MEDICINE

## 2025-05-28 PROCEDURE — 6370000000 HC RX 637 (ALT 250 FOR IP): Performed by: HOSPITALIST

## 2025-05-28 PROCEDURE — 36569 INSJ PICC 5 YR+ W/O IMAGING: CPT

## 2025-05-28 PROCEDURE — 6360000002 HC RX W HCPCS: Performed by: INTERNAL MEDICINE

## 2025-05-28 PROCEDURE — 85025 COMPLETE CBC W/AUTO DIFF WBC: CPT

## 2025-05-28 PROCEDURE — 97162 PT EVAL MOD COMPLEX 30 MIN: CPT

## 2025-05-28 PROCEDURE — 36415 COLL VENOUS BLD VENIPUNCTURE: CPT

## 2025-05-28 PROCEDURE — 80048 BASIC METABOLIC PNL TOTAL CA: CPT

## 2025-05-28 PROCEDURE — 99233 SBSQ HOSP IP/OBS HIGH 50: CPT | Performed by: INTERNAL MEDICINE

## 2025-05-28 PROCEDURE — 83735 ASSAY OF MAGNESIUM: CPT

## 2025-05-28 PROCEDURE — 97166 OT EVAL MOD COMPLEX 45 MIN: CPT

## 2025-05-28 PROCEDURE — 97116 GAIT TRAINING THERAPY: CPT

## 2025-05-28 PROCEDURE — C1751 CATH, INF, PER/CENT/MIDLINE: HCPCS

## 2025-05-28 PROCEDURE — 97530 THERAPEUTIC ACTIVITIES: CPT

## 2025-05-28 PROCEDURE — 82550 ASSAY OF CK (CPK): CPT

## 2025-05-28 PROCEDURE — 2580000003 HC RX 258: Performed by: INTERNAL MEDICINE

## 2025-05-28 PROCEDURE — 02HV33Z INSERTION OF INFUSION DEVICE INTO SUPERIOR VENA CAVA, PERCUTANEOUS APPROACH: ICD-10-PCS | Performed by: INTERNAL MEDICINE

## 2025-05-28 RX ORDER — OXYCODONE AND ACETAMINOPHEN 5; 325 MG/1; MG/1
2 TABLET ORAL EVERY 8 HOURS PRN
Qty: 12 TABLET | Refills: 0 | Status: SHIPPED | OUTPATIENT
Start: 2025-05-28 | End: 2025-05-31

## 2025-05-28 RX ORDER — OXYCODONE AND ACETAMINOPHEN 5; 325 MG/1; MG/1
1 TABLET ORAL EVERY 8 HOURS PRN
Qty: 8 TABLET | Refills: 0 | Status: SHIPPED | OUTPATIENT
Start: 2025-05-28 | End: 2025-05-28

## 2025-05-28 RX ADMIN — HYDROMORPHONE HYDROCHLORIDE 0.5 MG: 1 INJECTION, SOLUTION INTRAMUSCULAR; INTRAVENOUS; SUBCUTANEOUS at 07:36

## 2025-05-28 RX ADMIN — LIDOCAINE HYDROCHLORIDE ANHYDROUS 50 MG: 10 INJECTION, SOLUTION INFILTRATION at 10:54

## 2025-05-28 RX ADMIN — OXYCODONE AND ACETAMINOPHEN 1 TABLET: 5; 325 TABLET ORAL at 04:48

## 2025-05-28 RX ADMIN — HYDROMORPHONE HYDROCHLORIDE 0.5 MG: 1 INJECTION, SOLUTION INTRAMUSCULAR; INTRAVENOUS; SUBCUTANEOUS at 15:56

## 2025-05-28 RX ADMIN — SODIUM CHLORIDE: 0.9 INJECTION, SOLUTION INTRAVENOUS at 15:00

## 2025-05-28 RX ADMIN — SODIUM CHLORIDE 1250 MG: 0.9 INJECTION, SOLUTION INTRAVENOUS at 01:14

## 2025-05-28 RX ADMIN — CEFEPIME 2000 MG: 2 INJECTION, POWDER, FOR SOLUTION INTRAVENOUS at 05:41

## 2025-05-28 RX ADMIN — HYDROMORPHONE HYDROCHLORIDE 0.5 MG: 1 INJECTION, SOLUTION INTRAMUSCULAR; INTRAVENOUS; SUBCUTANEOUS at 11:20

## 2025-05-28 RX ADMIN — OXYCODONE AND ACETAMINOPHEN 2 TABLET: 5; 325 TABLET ORAL at 09:26

## 2025-05-28 RX ADMIN — WATER 2000 MG: 1 INJECTION INTRAMUSCULAR; INTRAVENOUS; SUBCUTANEOUS at 13:16

## 2025-05-28 RX ADMIN — DAPTOMYCIN 400 MG: 500 INJECTION, POWDER, LYOPHILIZED, FOR SOLUTION INTRAVENOUS at 15:02

## 2025-05-28 RX ADMIN — SODIUM CHLORIDE: 0.9 INJECTION, SOLUTION INTRAVENOUS at 11:27

## 2025-05-28 RX ADMIN — SODIUM CHLORIDE 1250 MG: 0.9 INJECTION, SOLUTION INTRAVENOUS at 11:29

## 2025-05-28 RX ADMIN — OXYCODONE AND ACETAMINOPHEN 2 TABLET: 5; 325 TABLET ORAL at 14:55

## 2025-05-28 ASSESSMENT — PAIN SCALES - GENERAL
PAINLEVEL_OUTOF10: 8
PAINLEVEL_OUTOF10: 0
PAINLEVEL_OUTOF10: 4
PAINLEVEL_OUTOF10: 8
PAINLEVEL_OUTOF10: 1
PAINLEVEL_OUTOF10: 1
PAINLEVEL_OUTOF10: 7
PAINLEVEL_OUTOF10: 8
PAINLEVEL_OUTOF10: 3
PAINLEVEL_OUTOF10: 1
PAINLEVEL_OUTOF10: 7
PAINLEVEL_OUTOF10: 6
PAINLEVEL_OUTOF10: 7

## 2025-05-28 ASSESSMENT — PAIN DESCRIPTION - DESCRIPTORS
DESCRIPTORS: ACHING
DESCRIPTORS: ACHING;DISCOMFORT
DESCRIPTORS: ACHING;DISCOMFORT
DESCRIPTORS: ACHING
DESCRIPTORS: ACHING;DISCOMFORT
DESCRIPTORS: ACHING;DISCOMFORT
DESCRIPTORS: ACHING

## 2025-05-28 ASSESSMENT — PAIN SCALES - WONG BAKER
WONGBAKER_NUMERICALRESPONSE: NO HURT

## 2025-05-28 ASSESSMENT — PAIN DESCRIPTION - ORIENTATION
ORIENTATION: RIGHT

## 2025-05-28 ASSESSMENT — PAIN DESCRIPTION - LOCATION
LOCATION: KNEE

## 2025-05-28 ASSESSMENT — PAIN - FUNCTIONAL ASSESSMENT
PAIN_FUNCTIONAL_ASSESSMENT: ACTIVITIES ARE NOT PREVENTED
PAIN_FUNCTIONAL_ASSESSMENT: ACTIVITIES ARE NOT PREVENTED

## 2025-05-28 NOTE — PROCEDURES
PROCEDURE NOTE  Date: 2025   Name: Willow Carson  YOB: 1995    Procedures                                                                     SINGLE PICC PROCEDURE NOTE  Chart reviewed for allergies, diagnosis, labs, known contraindications, reason for line placement and planned length of treatment.  Informed consent noted to be signed and on chart.  Insertion procedure discussed with patient/family member.  Three patient identifiers - Patient name,   and MRN -  completed &  confirmed verbally.         Time out performed Hat, mask and eye shield donned.  PICC site cleaned with chlorhexidine wipes then scrubbed with Chloraprep  One-Step applicator for 30 seconds x 1.   Hand Hygiene  performed with 3% Chlorhexidine surgical scrub x1 min prior to  sterile gloves, sterile gown being donned.  Patient draped using maximal sterile barrier technique ( head to toe ).  PICC site scrubbed a 2nd time with Chloraprep One-Step applicator x 30 sec. Modified Seldinger technique/ultrasound assisted and tip locating system utilized for insertion and 1% Lidocaine 5 ml injected intradermal pre-insertion.  PICC tip location in the SVC confirmed by ECG technology.   Positive brisk blood return obtained from lumen.  Valve applied to lumen and flushed with 10 mls  0.9% Sterile Sodium Chloride.  All lumens flush easily with no resistance.  Skin prep applied to site.  Catheter secured with non-sutured locking device per hospital protocol. Bio-patch/CHG impregnated sterile tegaderm dressing applied.  Alcohol Swab Cap applied to valve.  Sterile field maintained during procedure.  PICC insertion, rhythm and positioning wire (utilized prn) accounted for post procedure and disposed of in sharps.  Appearance of site is Clean dry and intact without bleeding or edema. All edges of Tegaderm occlusive.   Site marked with date and initials of RN placing line. Teaching performed to pt/family and noted in education section.

## 2025-05-28 NOTE — CARE COORDINATION
Case Management Assessment            Discharge Note                    Date / Time of Note: 5/28/2025 1:54 PM                  Discharge Note Completed by: Ivette Jones RN    Patient Name: Willow Carson   YOB: 1995  Diagnosis: Septic arthritis (HCC) [M00.9]  Pyogenic arthritis of right knee joint, due to unspecified organism (HCC) [M00.9]   Date / Time: 5/25/2025 10:40 PM    Current PCP: No, Pcp  Clinic patient: No    Hospitalization in the last 30 days: No       Advance Directives:  Code Status: Full Code  [place] DNR form completed and on chart: Not Indicated    Financial:  Payor: [facility] / Plan: [facility] MEDICAID / Product Type: *No Product type* /      Pharmacy:    [id number] [address] [address] [facility] - P [phone] - F [phone]  [address] [place] [address]  Phone: [phone] Fax: [phone]    [id number] [address] - P [phone] - F [phone]  [address] [facility] [address]  Phone: [phone] Fax: [phone]      Assistance purchasing medications?:    Assistance provided by Case Management: None at this time    Does patient want to participate in local refill/ meds to beds program?:      Meds To Beds General Rules:  1. Can ONLY be done Monday- Friday between 8:30am-5pm  2. Prescription(s) must be in pharmacy by 3pm to be filled same day  3.Copy of patient's insurance/ prescription drug card and patient face sheet must be sent along with the prescription(s)  4. Cost of Rx cannot be added to hospital bill. If financial assistance is needed, please contact unit  or ;  or  CANNOT provide pharmacy voucher for patients co-pays  5. Patients can then  the prescription on their way out of the hospital at discharge, or pharmacy can deliver to the bedside if staff is available. (payment due at time of 
MIGEL spoke with Dr. Beasley. Final ID recs for home IV abx have been updated.     Migel spoke with Gabriela at [facility]. Aware final recs for IV abx. She will be at hospital to do teaching soon.    Migel called in IV abx orders.     MIGEL spoke with pt at bedside. Aware of plan for arranging IV abx at home. Pt hoping to dc home later today.   
Durable Medical Equipment            Potential DME: Crutches  Patient expects to discharge to: House  Plan for transportation at discharge: Self    Financial    [facility] / [facility]: [facility]ICA[facility] / Product Type: *No Product type* /     Does insurance require precert for SNF: Yes    Potential assistance Purchasing Medications:    Meds-to-Beds request:        [id number] - [place], [place] - [address] - P [phone] - F [phone]  [address] [address] [address]  Phone: [phone] Fax: [phone]    [id number] - [place], [place] - [address] - P [phone] - F [phone] [address] [address]  Phone: [phone] Fax: [phone]      Notes:    Factors facilitating achievement of predicted outcomes: Family support, Motivated, Cooperative, and Pleasant    Barriers to discharge: Pain and Medical ID clearance    Additional Case Management Notes:     CM  following for  d/c planning:    CM met with pt at  bedside  , pt from home with family Indep at  baseline  ; No  current services  and No DME :  Admitted : Septic arthritic kne  s/p I&D:  -  ID consulted  will d/c home  with C  and IV atbx Infusion:    CM  d/w pt the  Home care  process  and she acknowledged    Referral to  [facility]  ,   -  Gabriela  [facility] liaison , [phone], ran  benefits  and pt will be covered at  100% , she will need  a  [place] form completed  once the  d/c  IV atbx  drug is  known it  basically has  a third party  verify the  discharge  drug.      [facility]    Services Available   Home Nursing      Address   [address] [address]             Contact Information    [phone]        [facility]    Services Available   Infusion and IV Therapy, Infusion Clinic      Address   [address] [place]  [place] [facility] [address]             Contact Information

## 2025-05-28 NOTE — DISCHARGE SUMMARY
Hospital Discharge Summary    Patient's PCP: No, Pcp  Admit Date: 5/25/2025   Discharge Date: 5/28/2025    Admitting Physician: Dr. Anupama Romero MD  Discharge Physician: Dr. Zoë Yap MD   Consults: ID and orthopedic surgery    HPI:     Brief hospital course:  Given the concern of the patients presentation and the concern of the possible multi-factorial etiology contributing to patients symptomatology. Patient was admitted and evaluated and found to have :     Discharge Diagnoses:     Right knee septic arthritis  - Presented with acute onset right knee pain of about 4 days  - WBC was 10.5 and she was afebrile on presentation, (even though mild leukocytosis 12.8 5/27/2025; likely transient, following arthrotomy, no other consistent features of SIRS; sepsis confirmed ruled out).   -  ESR/CRP was 17/38.2 respectively on presentation.   -  In ED patient underwent arthrocentesis with fluids study suggestive of possible septic arthritis (34K WBC, 91% neutrophil)   - No trauma or clear triggering factor.  She is not diabetic or immunocompromise.  She denies IV drug use.  She is a smoker  - OR with Dr. Clemente on 5/26 for right knee arthrotomy and incision and drainage for septic knee; large amount of cloudy fluid with pyogenic arthritis encountered.  OR cx without organisms on Gram stain, no growth to date  - Drain removed 5/27/25   - Was treated with Vanc/cefepime  - Follow cultures: NGTD.   - HIV: negative  - Gonorrhea and chlamydia and urine PCR to rule out gonococcal disseminated infection: negative  - Also Lyme testing also ordered per ID: in process. O/P F/u  - She had root canal done recently, and there is a question if potentially, in the absence of any other identified sources, a dental source may be possible.   - ID consulted:: [facility], line 5/28/25:   Daptomycin and ceftriaxone through 6/23/25 with Home Care  - Follow up with PCP        Tobacco abuse- counseled     Asthma  Depression and anxiety  -cont

## 2025-05-28 NOTE — DISCHARGE INSTRUCTIONS
Caring for Your Peripherally Inserted Central Catheter (PICC)      You are going home with a peripherally inserted catheter (PICC). This small, soft tube has been placed in a vein in your arm. It is often used when treatment requires medications or nutrition for weeks or months. At home, you need to take care of your PICC to keep it working. And since a PICC line has such a high infection risk, you must take extra care washing your hands and preventing the spread of germs. This sheet will help you remember what to do to care for your PICC at home.    It was a pleasure helping you today.  Call Your Primary Infusion Nurse or Ordering Doctor with any questions. If there are any questions for the Vascular Access Team here at [facility] our number is [phone] leave a message and we will call you back as soon as possible.    We are available Monday through Friday 730 to 5pm.  Thank You    Understanding Your Role  . A nurse or other healthcare provider will teach you and your caregivers how to care for the PICC. Before leaving the hospital, make sure that you understand what to do at home, how long you may need the PICC, and when to have a follow up visit.  . You will likely be told to flush the PICC with saline or heparin solution. You may also be told to change the catheter’s injection caps and change the dressing (Bandage). Or, a nurse may do this for you during a follow-up visit. Only do these things if you are told to, following the instructions you were given.    Protecting the PICC  If the PICC gets damaged, it won’t work right and could raise you chance of infection. Call your healthcare team right away if any damage occurs. To protect your PICC at home:  . Prevent infection. Use good hand hygiene by following the guidelines on this sheet. Don’t touch the catheter or the dressing unless you need to. Always clean your hands before and after you come in contact with any part of the PICC. Your caregivers, family

## 2025-05-28 NOTE — PLAN OF CARE
Problem: Discharge Planning  Goal: Discharge to home or other facility with appropriate resources  5/28/2025 1542 by Sonja Gordon RN  Outcome: Adequate for Discharge  5/28/2025 1542 by Sonja Gordon RN  Outcome: Progressing     Problem: Pain  Goal: Verbalizes/displays adequate comfort level or baseline comfort level  Outcome: Adequate for Discharge     Problem: Respiratory - Adult  Goal: Achieves optimal ventilation and oxygenation  Outcome: Adequate for Discharge     Problem: Cardiovascular - Adult  Goal: Absence of cardiac dysrhythmias or at baseline  Outcome: Adequate for Discharge     Problem: Safety - Adult  Goal: Free from fall injury  Outcome: Adequate for Discharge     Problem: Chronic Conditions and Co-morbidities  Goal: Patient's chronic conditions and co-morbidity symptoms are monitored and maintained or improved  Outcome: Adequate for Discharge     Problem: Skin/Tissue Integrity  Goal: Skin integrity remains intact  Description: 1.  Monitor for areas of redness and/or skin breakdown2.  Assess vascular access sites hourly3.  Every 4-6 hours minimum:  Change oxygen saturation probe site4.  Every 4-6 hours:  If on nasal continuous positive airway pressure, respiratory therapy assess nares and determine need for appliance change or resting period  Outcome: Adequate for Discharge

## 2025-05-28 NOTE — PLAN OF CARE
Problem: Pain  Goal: Verbalizes/displays adequate comfort level or baseline comfort level  5/27/2025 2050 by Art Clancy RN  Outcome: Progressing     Problem: Cardiovascular - Adult  Goal: Absence of cardiac dysrhythmias or at baseline  5/27/2025 2050 by Art Clancy RN  Outcome: Progressing  Flowsheets (Taken 5/27/2025 2038)  Absence of cardiac dysrhythmias or at baseline: Assess for signs of decreased cardiac output     Problem: Chronic Conditions and Co-morbidities  Goal: Patient's chronic conditions and co-morbidity symptoms are monitored and maintained or improved  5/27/2025 2050 by Art Clancy RN  Outcome: Progressing     Problem: Skin/Tissue Integrity  Goal: Skin integrity remains intact  Description: 1.  Monitor for areas of redness and/or skin breakdown2.  Assess vascular access sites hourly3.  Every 4-6 hours minimum:  Change oxygen saturation probe site4.  Every 4-6 hours:  If on nasal continuous positive airway pressure, respiratory therapy assess nares and determine need for appliance change or resting period  Outcome: Progressing

## 2025-05-28 NOTE — PROGRESS NOTES
V2.0    [facility] [place]        I provided tobacco cessation counseling during our visit. We discussed the benefits of quitting tobacco on cancer care and risk of coronary artery disease .     We discussed treatment options and I recommended use of both counseling and medication to reduce or quit using tobacco.     I spent 11 minutes discussing tobacco treatment and counseling with the patient during this encounter.    Chriss Barnes    
  Department of Orthopedic Surgery  Physician Assistant   Progress Note    Subjective:       Systemic or Specific Complaints:Pain Control    Objective:     Patient Vitals for the past 24 hrs:   BP Temp Temp src Pulse Resp SpO2   05/27/25 0936 -- -- -- -- 16 --   05/27/25 0905 123/78 98.1 °F (36.7 °C) Oral 90 18 99 %   05/27/25 0453 111/74 98.1 °F (36.7 °C) Oral 77 16 98 %   05/26/25 2310 112/75 98.1 °F (36.7 °C) Oral 78 16 96 %   05/26/25 2033 115/74 97.9 °F (36.6 °C) Oral 87 16 98 %   05/26/25 1835 -- -- -- -- 16 --   05/26/25 1619 -- -- -- -- 16 --   05/26/25 1549 -- -- -- -- 16 --   05/26/25 1548 121/71 98.3 °F (36.8 °C) Oral 85 18 100 %   05/26/25 1313 -- -- -- -- 16 --   05/26/25 1242 116/65 98.3 °F (36.8 °C) Oral 80 18 98 %       General: alert, appears stated age, and cooperative   Wound: Wound clean and dry no evidence of infection., No Erythema, No Drainage, Wound Intact, and Positive for Edema   Motion: Painful range of Motion in affected extremity   DVT Exam: No evidence of DVT seen on physical exam.     Additional exam: NVI.  Drain intact with minimal output over last 24hrs.    Distal pulses intact.  Sensation intact.      Data Review  CBC:   Lab Results   Component Value Date/Time    WBC 12.2 05/27/2025 05:01 AM    RBC 3.81 05/27/2025 05:01 AM    HGB 11.8 05/27/2025 05:01 AM    HCT 35.1 05/27/2025 05:01 AM     05/27/2025 05:01 AM       Renal:   Lab Results   Component Value Date/Time     05/27/2025 05:01 AM    K 3.8 05/27/2025 05:01 AM     05/27/2025 05:01 AM    CO2 23 05/27/2025 05:01 AM    BUN 11 05/27/2025 05:01 AM    CREATININE 0.7 05/27/2025 05:01 AM    GLUCOSE 102 05/27/2025 05:01 AM    CALCIUM 8.6 05/27/2025 05:01 AM            Assessment:      right septic knee with I&D.     Plan:      1:  PT/OT as able. WBAT .  Drain removed today.  Can be up with therapy as able. Appreciate ID input and assistance.  2:  Continue Deep venous thrombosis prophylaxis  3:  Continue Pain Control - 
1330 scheduled cefepime is not given. Dr. Ramos goldman MD stated that pt doesnot need the cefepime before discharge. She can get discharge without getting the dose. MD aware. Plan of care ongoing.   
4 Eyes Admission Assessment     I agree as the admission nurse that 2 RN's have performed a thorough Head to Toe Skin Assessment on the patient. ALL assessment sites listed below have been assessed on admission.       Areas assessed by both nurses: Latasha Flood and Sonja Gordon, RNs  [x]   Head, Face, and Ears   [x]   Shoulders, Back, and Chest  [x]   Arms, Elbows, and Hands   [x]   Coccyx, Sacrum, and Ischium  [x]   Legs, Feet, and Heels    NOTE:  Redness to Right Knee, admitted with Septic Arthritis (Right Knee).        Does the Patient have Skin Breakdown?  No         Sedrick Prevention initiated:  Yes   Wound Care Orders initiated:  NA      [facility] nurse consulted for Pressure Injury (Stage 3,4, Unstageable, DTI, NWPT, and Complex wounds) or Sedrick score 18 or lower:  NA      Nurse 1 eSignature: Electronically signed by Latasha Flood RN on 5/26/25 at 12:11 PM EDT    **SHARE this note so that the co-signing nurse is able to place an eSignature**    Nurse 2 eSignature: {Esignature:[id number]}   
D:  Patient is c/o 6/10 right knee pain.  She received Dilaudid 0.5 mg IV in the ED at 2359 but the pain is coming back again.  Please advise.  Thank you.  A:  Notified provider, Anupama Romero MD.  
ID Follow-up NOTE    CC:   Right knee pain  Antibiotics: Vancomycin, cefepime    Admit Date: 5/25/2025  Hospital Day: 3    Subjective:     Patient still having right knee pain.  No fevers noted overnight, right knee Hemovac removed today.      Objective:     Patient Vitals for the past 8 hrs:   BP Temp Temp src Pulse Resp SpO2   05/27/25 0905 123/78 98.1 °F (36.7 °C) Oral 90 18 99 %   05/27/25 0453 111/74 98.1 °F (36.7 °C) Oral 77 16 98 %     I/O last 3 completed shifts:  In: 734 [P.O.:480; I.V.:50.6; IV Piggyback:203.4]  Out: 0   No intake/output data recorded.    EXAM:  GENERAL: No apparent distress.    HEENT: Membranes moist, no oral lesion  NECK:  Supple, no lymphadenopathy  LUNGS: Clear b/l, no rales, no dullness  CARDIAC: RRR, no murmur appreciated  ABD:  + BS, soft / NT  EXT:  Right knee pain, swelling, drain is in place in the right knee with slight serosanguineous output.  Dressing is in place  NEURO: No focal neurologic findings  PSYCH: Orientation, sensorium, mood normal  LINES:  Peripheral iv       Data Review:  Lab Results   Component Value Date    WBC 12.2 (H) 05/27/2025    HGB 11.8 (L) 05/27/2025    HCT 35.1 (L) 05/27/2025    MCV 92.2 05/27/2025     05/27/2025     Lab Results   Component Value Date    CREATININE 0.7 05/27/2025    BUN 11 05/27/2025     05/27/2025    K 3.8 05/27/2025     05/27/2025    CO2 23 05/27/2025       Hepatic Function Panel:   Lab Results   Component Value Date/Time    ALKPHOS 66 07/03/2017 06:50 PM    ALT 9 07/03/2017 06:50 PM    AST 14 07/03/2017 06:50 PM    BILITOT 0.6 07/03/2017 06:50 PM       MICRO:    OR culture from right knee 5/26:No organisms on Gram stain, no growth to date    Right knee synovial fluid 5/26: 1+ GPC on Gram stain, no growth to date.  No crystals seen  Component  Ref Range & Units (hover) 5/26/25 0900 5/26/25 0126   Cell Count Fluid Type Knee   Right Knee   Right   Color, Fluid Red Pale Yellow   Appearance, Fluid Cloudy Cloudy   Clot 
ID Follow-up NOTE    CC:   Right knee pain  Antibiotics: Vancomycin, cefepime    Admit Date: 5/25/2025  Hospital Day: 4    Subjective:     Patient still having mild right knee pain.  No fevers noted overnight.       Objective:     Patient Vitals for the past 8 hrs:   BP Temp Temp src Pulse Resp SpO2   05/28/25 1120 -- -- -- -- 16 --   05/28/25 1119 121/60 98.1 °F (36.7 °C) Oral 67 16 --   05/28/25 0956 -- -- -- -- 16 --   05/28/25 0926 -- -- -- -- 16 --   05/28/25 0924 120/76 -- -- 81 -- --   05/28/25 0806 -- -- -- -- 18 --   05/28/25 0734 133/81 98.2 °F (36.8 °C) Oral 75 18 100 %   05/28/25 0518 -- -- -- -- 18 --   05/28/25 0408 116/73 97.5 °F (36.4 °C) Oral 73 18 100 %     I/O last 3 completed shifts:  In: 300 [P.O.:300]  Out: 0   No intake/output data recorded.    EXAM:  GENERAL: No apparent distress.    HEENT: Membranes moist, no oral lesion  NECK:  Supple, no lymphadenopathy  LUNGS: Clear b/l, no rales, no dullness  CARDIAC: RRR, no murmur appreciated  ABD:  + BS, soft / NT  EXT:  Right knee pain, swelling, drain is in place in the right knee with slight serosanguineous output.  Dressing is in place  NEURO: No focal neurologic findings  PSYCH: Orientation, sensorium, mood normal  LINES:  Peripheral iv       Data Review:  Lab Results   Component Value Date    WBC 8.1 05/28/2025    HGB 11.6 (L) 05/28/2025    HCT 34.1 (L) 05/28/2025    MCV 92.2 05/28/2025     05/28/2025     Lab Results   Component Value Date    CREATININE 0.8 05/28/2025    BUN 13 05/28/2025     05/28/2025    K 3.5 05/28/2025     05/28/2025    CO2 22 05/28/2025       Hepatic Function Panel:   Lab Results   Component Value Date/Time    ALKPHOS 66 07/03/2017 06:50 PM    ALT 9 07/03/2017 06:50 PM    AST 14 07/03/2017 06:50 PM    BILITOT 0.6 07/03/2017 06:50 PM       MICRO:  Gonorrhea and chlamydia PCR urine 5/26: Negative    OR culture from right knee 5/26:No organisms on Gram stain, no growth to date    Right knee synovial fluid 
Occupational Therapy  Facility/Department: [address]  Occupational Therapy Initial Assessment & DC     Name: Willow Carson  : 1995  MRN: [id number]  Date of Service: 2025    Discharge Recommendations:  24 hour supervision or assist, Home with assist PRN  OT Equipment Recommendations  Equipment Needed: No       Patient Diagnosis(es): The encounter diagnosis was Pyogenic arthritis of right knee joint, due to unspecified organism (HCC).  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  Past Surgical History:  has a past surgical history that includes Knee Arthrotomy (Right, 2025).    Treatment Diagnosis: imp mob, tf, ADL      Assessment  Assessment: From home with family. Pt completing mobility and transfers with SBA to SPVN this date. Completing ADLs with SBA to SPVN. No acute OT needs. Will sign off. Rec home with assist. Cont POC.  Treatment Diagnosis: imp mob, tf, ADL  Decision Making: Medium Complexity  REQUIRES OT FOLLOW-UP: No  Activity Tolerance  Activity Tolerance: Patient Tolerated treatment well     Plan  Occupational Therapy Plan  Times Per Week: dc    Restrictions  Restrictions/Precautions  Activity Level: Up as Tolerated  Position Activity Restriction  Other Position/Activity Restrictions: up with assist, WBAT R    Subjective  General  Chart Reviewed: Yes  Additional Pertinent Hx: 30 y.o. female with a PMHx significant for depression, anxiety, asthma, and obesity who is admitted with right knee septic arthritis.  Pt is s/p Rt knee arthrotomy with I&D (done ).  Referring Practitioner: Fernandez Way PA  Diagnosis: septic arthritis  Subjective  Subjective: In bed agreeable to session, no pain rating     Social/Functional History  Social/Functional History  Lives With:  (Mom, Dad, sister and pt has 3 yo)  Type of Home: House  Home Layout: Multi-level, Able to Live on Main level with 
PACU Transfer Note    Vitals:    05/26/25 0915   BP: 117/63   Pulse: 72   Resp: 13   Temp: 97.3 °F (36.3 °C)   SpO2: 100%       In: 110 [P.O.:60; I.V.:50]  Out: 0     Pain assessment:  none      Report given to Receiving unit RN.    5/26/2025 9:23 AM      
Patient admitted to PACU # 13 from OR at 0847 post RIGHT KNEE ARTHROTOMY - Right  per Dr. Clemente.  Attached to PACU monitoring system and report received from anesthesia provider.  Pt arrived to pacu with oral airway in place that was removed by pt. RLE surgical drsg c/d/I with acewrap. Pt has accordian drain in place that is compressed. Ice wrap applied. Pt NSR on monitor. Will continue to monitor.   
Patient declined admission questions at present due to request to sleep and answer during normal hours when awake during the day.  
Physical Therapy  Facility/Department: [address]  Physical Therapy Initial Assessment/treatment note/discharge note    Name: Willow Carson  : 1995  MRN: [id number]  Date of Service: 2025    Discharge Recommendations:  Home with assist PRN, Home with Home health PT   PT Equipment Recommendations  Equipment Needed:  (RW)      Patient Diagnosis(es): The encounter diagnosis was Pyogenic arthritis of right knee joint, due to unspecified organism (HCC).  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  Past Surgical History:  has a past surgical history that includes Knee Arthrotomy (Right, 2025).    Assessment  Assessment: 29 yo admitted 25 for septic arthritis; had R knee arthrotomy on 25 and WBAT R. Pt reports she has been on crutches at home since last Friday and plans to return home with assist per family at discharge. Recommend pt return home with increased supervision initially, use of RW and home PT for R knee weakness. Pt hopeful to go home today. No acute PT needs identified so will sign off. Recommend nursing encourage OOB PRN and often while pt remains in hospital. Discussed with RN. Pt will need RW for home.  Treatment Diagnosis: mobility impairment due to septic arthritis  Decision Making: Medium Complexity  Requires PT Follow-Up: No  Activity Tolerance  Activity Tolerance: Patient tolerated evaluation without incident;Patient limited by fatigue;Patient limited by pain (limited by R knee edema)    Plan  Safety Devices  Type of Devices: Call light within reach, Chair alarm in place, Gait belt, Left in chair, Nurse notified (pt reclined)    Restrictions  Restrictions/Precautions  Activity Level: Up as Tolerated  Position Activity Restriction  Other Position/Activity Restrictions: up with assist, WBAT R     Subjective  General  Chart Reviewed: Yes  Additional Pertinent Hx: 30 y.o. female with PMHx 
Pt ao4, vss. R knee pain is controlled w prn dilaudid and percocet. Pt denied chest pain, HA, n/v, sob, bm this shift. Dressing on RLE CDI with hemovac in place, minimal output. Pt calls out appropriately for assistance. Sister by bedside.   
Pt discharge to home with her sister. Pt is going home with left basilic PICC for long term abx. Pt tele removed. RN went over discharge paperwork with the pt. Pt verbalized understanding. Pt belonging sent it with her.  
Pt off to OR.  
[facility] -  Clinical Pharmacy Note    Vancomycin - Management by Pharmacy    Consult Date(s): 05/26/2025  Consulting Provider(s): Dr. Romero    Assessment / Plan  Septic arthritis of right knee- Vancomycin  Concurrent Antimicrobials: Cefepime  Day of Vanc Therapy / Ordered Duration: Day 1 of TBD  Current Dosing Method: Bayesian-Guided AUC Dosing  Therapeutic Goal: -600 mg/L*hr  Current Dose / Plan:   Scr appears to be stable at what seems to be baseline Scr = 0.7 today  Currently on 1500 mg IV Q12H s/p 2250 mg loading dose  Predicted AUC = 454 mg/L*hr ; Trough = 15.6 mg/L   Will plan to continue as planned and obtain level in ~48h unless otherwise clinically indicated  Will continue to monitor clinical condition and make adjustments to regimen as appropriate.    Thank you for consulting pharmacy,  Please call with any questions,  Sandhya Parker, PharmD  PGY1 Pharmacy Resident  Wireless: [phone]  5/26/2025 2:24 PM        Interval update:  Loading dose documented twice, per level, unlikely the case. S/p OR this AM for arthrotomy with ortho. Tissue sent for cx    Subjective/Objective:   Willow Carson is a 30 y.o. female with a PMHx significant for depression who is admitted with septic arthritis.     Pharmacy is consulted to dose Vancomycin.    Ht Readings from Last 1 Encounters:   05/25/25 1.626 m (5' 4\")     Wt Readings from Last 1 Encounters:   05/26/25 86.6 kg (191 lb)     Current & Prior Antimicrobial Regimen(s):  Cefepime (05/26- Current)  Vancomycin- Pharmacy to dose  2250 mg IV x1 (0526)  1500 mg IV Q12H (05/26- Current)    Vancomycin Level(s) / Doses:    Date Time Dose Type of Level / Level Interpretation   5/26 1334 S/p load Random = 14.2 mg/L ~7h post load  Load documented as given twice, ordered to clarify number of doses given in OR as day shift RN on floor unaware of any events noted  Level does not indicated 4.5g loading dose given, okay to proceed as planned          Note: Serum 
[facility] -  Clinical Pharmacy Note    Vancomycin - Management by Pharmacy    Consult Date(s): 05/26/2025  Consulting Provider(s): Dr. Romero    Assessment / Plan  1)  Rt knee septic arthritis - Vancomycin  Concurrent Antimicrobials: Cefepime  Day of Vanc Therapy / Ordered Duration: Day 3 of TBD  Current Dosing Method: Bayesian-Guided AUC Dosing  Therapeutic Goal: -600 mg/L*hr  Current Dose / Plan:   Currently on 1250mg IV q8h.    SCr stable at 0.8 this AM.  Calculated AUC = 549 with trough = 15.8 mcg/mL based on level 5/26.  Since dose was increased yesterday, will check repeat level tomorrow AM to ensure appropriate clearance.  Will continue to monitor clinical condition and make adjustments to regimen as appropriate.    Please call with questions--  Thanks--  Yoanna Rubio, PharmD, BCPS, BCGP  [id number] ([facility])   5/28/2025 9:45 AM      Interval update:  Synovial fluid culture and surgical culture with no growth.  ID following - will likely need OPAT per notes.    Subjective/Objective:   Willow Carson is a 30 y.o. female with a PMHx significant for depression, anxiety, asthma, and obesity who is admitted with right knee septic arthritis.  Pt is s/p Rt knee arthrotomy with I&D (done 5/26).    Pharmacy is consulted to dose Vancomycin.    Ht Readings from Last 1 Encounters:   05/25/25 1.626 m (5' 4\")     Wt Readings from Last 1 Encounters:   05/26/25 86.6 kg (191 lb)     Current & Prior Antimicrobial Regimen(s):  Cefepime (5/26-current)  Vancomycin - Pharmacy to dose  2250 mg IV x1 5/25 06:30  1500 mg IV Q12H (5/26- 5/27)  1250 mg IV Q8H (5/27-current)    Vancomycin Level(s) / Doses:    Date Time Dose Type of Level / Level Interpretation   5/26 13:34 2250mg IV x1 LD Random = 14.2 mg/L ~7h post load  Load documented as given twice, ordered to clarify number of doses given in OR as day shift RN on floor unaware of any events noted  Level does not indicated 4.5g loading dose given, okay 
[facility] -  Clinical Pharmacy Note    Vancomycin - Management by Pharmacy    Consult Date(s): 05/26/2025  Consulting Provider(s): Dr. Romero    Assessment / Plan  1)  Rt knee septic arthritis - Vancomycin  Concurrent Antimicrobials: Cefepime  Day of Vanc Therapy / Ordered Duration: Day 2 of TBD  Current Dosing Method: Bayesian-Guided AUC Dosing  Therapeutic Goal: -600 mg/L*hr  Current Dose / Plan:   Currently on 1500mg IV q12h.   SCr stable at 0.7 today.  Random level yesterday afternoon = 14.2 mcg/mL.    Calculated AUC on low end at 442 with < 80% probability of reaching AUC > 400.  Will adjust to 1250mg IV q8h.  Regimen predicts an AUC = 549 with trough = 15.8 mcg/mL.  Will plan for repeat level in ~2 days (or sooner if clinically indicated).  Will continue to monitor clinical condition and make adjustments to regimen as appropriate.    Please call with questions--  Thanks--  Yoanna Rubio, SummerD, BCPS, BCGP  [id number] ([facility])   5/27/2025 10:32 AM      Interval update:  Synovial fluid culture and surgical culture in process.  ID following.    Subjective/Objective:   Willow Carson is a 30 y.o. female with a PMHx significant for depression, anxiety, asthma, and obesity who is admitted with right knee septic arthritis.  Pt is s/p Rt knee arthrotomy with I&D (done 5/26).    Pharmacy is consulted to dose Vancomycin.    Ht Readings from Last 1 Encounters:   05/25/25 1.626 m (5' 4\")     Wt Readings from Last 1 Encounters:   05/26/25 86.6 kg (191 lb)     Current & Prior Antimicrobial Regimen(s):  Cefepime (5/26-current)  Vancomycin - Pharmacy to dose  2250 mg IV x1 5/25 06:30  1500 mg IV Q12H (5/26- Current)    Vancomycin Level(s) / Doses:    Date Time Dose Type of Level / Level Interpretation   5/26 13:34 2250mg IV x1 LD Random = 14.2 mg/L ~7h post load  Load documented as given twice, ordered to clarify number of doses given in OR as day shift RN on floor unaware of any events 
tender  Genitourinary: no suprapubic tenderness  Musculoskeletal: Rt leg covered with dressing with drain with some bloody fluids   Skin: warm, dry  Neuro: Alert.  Psych: Mood appropriate.         Medications:   Medications:    vancomycin  1,250 mg IntraVENous Q8H    sodium chloride flush  5-40 mL IntraVENous 2 times per day    enoxaparin  40 mg SubCUTAneous Daily    cefepime  2,000 mg IntraVENous q8h    acetaminophen  1,000 mg Oral Once    scopolamine  1 patch TransDERmal Q72H      Infusions:    sodium chloride 25 mL/hr at 05/27/25 1326     PRN Meds: albuterol, 2.5 mg, Q4H PRN  sodium chloride flush, 5-40 mL, PRN  sodium chloride, , PRN  potassium chloride, 40 mEq, PRN   Or  potassium alternative oral replacement, 40 mEq, PRN   Or  potassium chloride, 10 mEq, PRN  magnesium sulfate, 2,000 mg, PRN  ondansetron, 4 mg, Q8H PRN   Or  ondansetron, 4 mg, Q6H PRN  polyethylene glycol, 17 g, Daily PRN  acetaminophen, 650 mg, Q6H PRN   Or  acetaminophen, 650 mg, Q6H PRN  HYDROmorphone, 0.25 mg, Q3H PRN   Or  HYDROmorphone, 0.5 mg, Q3H PRN  oxyCODONE-acetaminophen, 1 tablet, Q4H PRN   Or  oxyCODONE-acetaminophen, 2 tablet, Q4H PRN        Labs and Imaging   XR KNEE RIGHT (1-2 VIEWS)  Result Date: 5/26/2025  EXAM: XR KNEE RIGHT (1-2 VIEWS) INDICATION: pain, swelling COMPARISON: None. TECHNIQUE: 2 views of right knee FINDINGS: No evidence of acute fracture. There is a large knee joint effusion demonstrated. Joint spaces are maintained.     1.  Large knee joint effusion, but no acute osseous abnormality demonstrated. Electronically signed by Luca Magana      CBC:   Recent Labs     05/25/25  2345 05/27/25  0501   WBC 10.5 12.2*   HGB 12.6 11.8*    210     BMP:    Recent Labs     05/25/25  2345 05/26/25  0730 05/27/25  0501    138 138   K 4.0 3.7 3.8    106 106   CO2 23 24 23   BUN 14 14 11   CREATININE 0.7 0.7 0.7   GLUCOSE 98 90 102*     Hepatic: No results for input(s): \"AST\", \"ALT\", \"BILITOT\", \"ALKPHOS\" in 
5/26/2025 at 11:24 AM

## 2025-05-29 ENCOUNTER — CLINICAL DOCUMENTATION (OUTPATIENT)
Dept: INFECTIOUS DISEASES | Age: 30
End: 2025-05-29

## 2025-05-29 ENCOUNTER — TELEPHONE (OUTPATIENT)
Dept: INFECTIOUS DISEASES | Age: 30
End: 2025-05-29

## 2025-05-29 DIAGNOSIS — M00.9 PYOGENIC ARTHRITIS OF RIGHT KNEE JOINT, DUE TO UNSPECIFIED ORGANISM (HCC): Primary | ICD-10-CM

## 2025-05-29 LAB
BACTERIA FLD AEROBE CULT: NORMAL
GRAM STN SPEC: NORMAL

## 2025-05-29 NOTE — PROGRESS NOTES
He has placed a referral order for pharmacist to manage Outpatient Parental Antimicrobial Therapy (OPAT) pursuant the ID Collaborative Practice Agreement.     Pertinent PMH and HPI:  PMH asthma, depression, anxiety     Presents  with nontraumatic R knee pain and swelling      Pertinent Objective Data:    Wt Readings from Last 1 Encounters:   25 86.6 kg (191 lb)      BMI Readings from Last 1 Encounters:   25 32.79 kg/m²      Serum creatinine: 0.8 mg/dL 25  Estimated creatinine clearance: 108 mL/min    Lab Results   Component Value Date    CRP 38.2 (H) 2025       Lab Results   Component Value Date    SEDRATE 2025       Lab Results   Component Value Date    CKTOTAL 2025       Imagin/25 XRAY:  FINDINGS:     No evidence of acute fracture. There is a large knee joint effusion demonstrated. Joint spaces are maintained.     IMPRESSION:     1.  Large knee joint effusion, but no acute osseous abnormality demonstrated.       Micro:    aspirate and sx cx: NGTD   Latest Reference Range & Units 25 01:26 25 09:00   Color, Fluid  Pale Yellow Red   Source + CELL CT/DIFF-BF  Knee   Right Knee   Right   Appearance, Fluid  Cloudy Cloudy   RBC, Fluid /cumm 2,500 68,000   Lymphocytes, Body Fluid % 3 6   Monocyte Count, Fluid % 15 3   Neutrophil Count, Fluid % 82 91   Nucl Cell, Fluid /cumm 31,466 34,360   Crystals, Fluid  None Seen None Seen   Clot Eval.  see below see below   Number of Cells Counted Fluid  100 100   Source Body Fluid  Knee Knee      STI panel negative    Lyme panel negative     OPAT Orders:  Diagnosis  Cx negative R knee septic arthritis s/p arthrotomy :    - Deep Infection (muscle/fascia) present as evidenced by fluid consistent with infection        Antimicrobial Regimen and Projected Term Date IV dapto 400 mg daily-   ~6 mg/kg based on adjusted   IV ceftriaxone 2gm daily-     (4 weeks)   Weekly Lab Monitoring CBCwDiff,

## 2025-05-29 NOTE — TELEPHONE ENCOUNTER
Spoke with Harry at [facility] and Zaynab SILVEIRA at [facility] and verified OPAT orders:    daptomycin 400mg qd and ceftriaxone 2 g qd   - Planned End date: 6/23/2025   CBC w diff, CMP, ESR, CRP, CK    LVM for patient to return call to verify IV abx received and patient is doing ok.  Office contact information provided.       5/30/25 1330  Received VM from patient reporting she is doing fine and she received all abx and [facility] RN was out to see her yesterday.  Office contact information provided for any questions or concerns.

## 2025-05-29 NOTE — TELEPHONE ENCOUNTER
Prescription Refill      Medication Name: OXYCODONE EVERY 4-6 HOURS   Pharmacy: [facility]  Patient Contact Number: [phone]    Please call patient to make aware once meds are sent

## 2025-05-31 LAB
BACTERIA SPEC AEROBE CULT: NORMAL
BACTERIA SPEC ANAEROBE CULT: NORMAL
GRAM STN SPEC: NORMAL

## 2025-06-02 LAB
ALBUMIN: 4 GM/DL (ref 3.5–5.2)
ALP BLD-CCNC: 70 U/L (ref 36–123)
ALT SERPL-CCNC: 8 U/L
ANION GAP SERPL CALCULATED.3IONS-SCNC: 14 MMOL/L (ref 7–16)
AST SERPL-CCNC: 14 U/L
BACTERIA FLD AEROBE CULT: NORMAL
BACTERIA SPEC AEROBE CULT: NORMAL
BACTERIA SPEC ANAEROBE CULT: NORMAL
BASOPHILS ABSOLUTE: 0.1 X10(3)/MCL (ref 0–0.1)
BASOPHILS RELATIVE PERCENT: 0.4 %
BILIRUB SERPL-MCNC: 0.2 MG/DL (ref 0.2–1.3)
BUN BLDV-MCNC: 16 MG/DL (ref 6–20)
C-REACTIVE PROTEIN WIDE RANGE: 28.65 MG/L
CALCIUM SERPL-MCNC: 9 MG/DL (ref 8.6–10.4)
CHLORIDE BLD-SCNC: 100 MMOL/L (ref 98–107)
CO2: 24 MMOL/L (ref 22–29)
CREAT SERPL-MCNC: 0.63 MG/DL (ref 0.51–1.3)
CREATINE KINASE ISOENZYMES, SER/PLAS: 43 U/L (ref 26–192)
EGFR (CKD-EPI): 120 ML/MIN/1.73 M2
EOSINOPHIL # BLD: 3.3 %
EOSINOPHILS ABSOLUTE: 0.4 X10(3)/MCL (ref 0–0.5)
GLUCOSE BLD-MCNC: 41 MG/DL (ref 70–99)
GRAM STN SPEC: NORMAL
GRAM STN SPEC: NORMAL
HCT VFR BLD CALC: 39.8 % (ref 34–45)
HEMOGLOBIN: 11.7 G/DL (ref 11.2–15.7)
IMMATURE CELLS ABSOLUTE COUNT: 0 X10(3)/MCL (ref 0–0.1)
IMMATURE GRANULOCYTES %: 0.3 %
LYMPHOCYTES # BLD: 14.1 %
LYMPHOCYTES ABSOLUTE: 1.6 X10(3)/MCL (ref 1.2–3.9)
MCH RBC QN AUTO: 30.7 PG (ref 26–34)
MCHC RBC AUTO-ENTMCNC: 29.4 G/DL (ref 30.7–35.5)
MCV RBC AUTO: 104.5 FL (ref 80–100)
MONOCYTES ABSOLUTE: 0.8 X10(3)/MCL (ref 0.3–0.9)
MONOCYTES RELATIVE PERCENT: 6.6 %
NEUTROPHILS ABSOLUTE: 8.8 X10(3)/MCL (ref 1.6–6.1)
NEUTROPHILS: 75.3 %
PDW BLD-RTO: 13.4 %
PLATELET # BLD: 283 X10(3)/MCL (ref 155–369)
PMV BLD AUTO: 10.5 FL (ref 8.8–12.5)
POTASSIUM SERPL-SCNC: 4.3 MMOL/L (ref 3.5–5)
RBC # BLD: 3.81 X10(6)/MCL (ref 3.9–5.2)
SED RATE, AUTOMATED: 16 MM/HR (ref 0–20)
SODIUM BLD-SCNC: 138 MMOL/L (ref 136–145)
TOTAL PROTEIN: 6.2 GM/DL (ref 6.4–8.3)
WBC # BLD: 11.7 X10(3)/MCL (ref 3.7–10.3)

## 2025-06-03 ENCOUNTER — OFFICE VISIT (OUTPATIENT)
Dept: ORTHOPEDIC SURGERY | Age: 30
End: 2025-06-03

## 2025-06-03 VITALS — WEIGHT: 191 LBS | BODY MASS INDEX: 32.61 KG/M2 | HEIGHT: 64 IN

## 2025-06-03 DIAGNOSIS — M00.9 PYOGENIC ARTHRITIS OF RIGHT KNEE JOINT, DUE TO UNSPECIFIED ORGANISM (HCC): Primary | ICD-10-CM

## 2025-06-03 PROCEDURE — 99024 POSTOP FOLLOW-UP VISIT: CPT | Performed by: NURSE PRACTITIONER

## 2025-06-03 NOTE — PROGRESS NOTES
DIAGNOSIS:  Right septic knee, s/p arthrotomy and incision and drainage.    DATE OF SURGERY:  5/262025.    HISTORY OF PRESENT ILLNESS:  Ms. Carson 30 y.o.  female who came in today for 2 weeks postoperative visit.  The patient denies any significant pain in the right knee. Rates pain a 6/10 VAS moderate, aching, intermittent and are improving. Aggravating factors movement. Alleviating factors rest. She has been WBAT.  No numbness or tingling sensation. No fever or Chills. She grew up NGTD and on IV antibiotics through PICC.    PHYSICAL EXAMINATION:  The incision healing well, sutures intact .  No signs of any erythema or drainage, moderate swelling. She has no pain with the active or passive range of motion of the right knee, decreased ROM flexion and extension.  She has intact sensation distally, and she is neurovascularly intact.    IMPRESSION:  2 weeks out from right septic knee s/p  incision and drainage and arthrotomy, and doing very well.    PLAN: She can go back to normal activity with no heavy impact activities for 6 weeks. Continue antibiotics managed by ID.  Dressing change daily and PRN. The patient will come back for a follow up in 1 week for wound check and will consider suture removal.      The patient understands that there is a chance of septic knee recurrence even after surgical I&D.      KATHE Orr - RO'

## 2025-06-04 ENCOUNTER — TELEPHONE (OUTPATIENT)
Dept: INFECTIOUS DISEASES | Age: 30
End: 2025-06-04

## 2025-06-09 LAB
ALBUMIN: 4 GM/DL (ref 3.5–5.2)
ALP BLD-CCNC: 72 U/L (ref 36–123)
ALT SERPL-CCNC: 12 U/L
ANION GAP SERPL CALCULATED.3IONS-SCNC: 10 MMOL/L (ref 7–16)
AST SERPL-CCNC: 14 U/L
BACTERIA FLD AEROBE CULT: NORMAL
BACTERIA SPEC AEROBE CULT: NORMAL
BACTERIA SPEC ANAEROBE CULT: NORMAL
BASOPHILS ABSOLUTE: 0.1 X10(3)/MCL (ref 0–0.1)
BASOPHILS RELATIVE PERCENT: 1.4 %
BILIRUB SERPL-MCNC: <0.2 MG/DL (ref 0.2–1.3)
BUN BLDV-MCNC: 17 MG/DL (ref 6–20)
C-REACTIVE PROTEIN WIDE RANGE: 7.53 MG/L
CALCIUM SERPL-MCNC: 8.9 MG/DL (ref 8.6–10.4)
CHLORIDE BLD-SCNC: 103 MMOL/L (ref 98–107)
CO2: 26 MMOL/L (ref 22–29)
CREAT SERPL-MCNC: 0.78 MG/DL (ref 0.51–1.3)
CREATINE KINASE ISOENZYMES, SER/PLAS: 44 U/L (ref 26–192)
EGFR (CKD-EPI): 103 ML/MIN/1.73 M2
EOSINOPHIL # BLD: 5.9 %
EOSINOPHILS ABSOLUTE: 0.5 X10(3)/MCL (ref 0–0.5)
GLUCOSE BLD-MCNC: 73 MG/DL (ref 70–99)
GRAM STN SPEC: NORMAL
GRAM STN SPEC: NORMAL
HCT VFR BLD CALC: 39.5 % (ref 34–45)
HEMOGLOBIN: 12 G/DL (ref 11.2–15.7)
IMMATURE CELLS ABSOLUTE COUNT: 0 X10(3)/MCL (ref 0–0.1)
IMMATURE GRANULOCYTES %: 0.2 %
LYMPHOCYTES # BLD: 33.1 %
LYMPHOCYTES ABSOLUTE: 2.8 X10(3)/MCL (ref 1.2–3.9)
MCH RBC QN AUTO: 30 PG (ref 26–34)
MCHC RBC AUTO-ENTMCNC: 30.4 G/DL (ref 30.7–35.5)
MCV RBC AUTO: 98.8 FL (ref 80–100)
MONOCYTES ABSOLUTE: 0.7 X10(3)/MCL (ref 0.3–0.9)
MONOCYTES RELATIVE PERCENT: 7.8 %
NEUTROPHILS ABSOLUTE: 4.3 X10(3)/MCL (ref 1.6–6.1)
NEUTROPHILS: 51.6 %
PDW BLD-RTO: 12.8 %
PLATELET # BLD: 367 X10(3)/MCL (ref 155–369)
PMV BLD AUTO: 10 FL (ref 8.8–12.5)
POTASSIUM SERPL-SCNC: 4.7 MMOL/L (ref 3.5–5)
RBC # BLD: 4 X10(6)/MCL (ref 3.9–5.2)
SED RATE, AUTOMATED: 15 MM/HR (ref 0–20)
SODIUM BLD-SCNC: 139 MMOL/L (ref 136–145)
TOTAL PROTEIN: 6.4 GM/DL (ref 6.4–8.3)
WBC # BLD: 8.4 X10(3)/MCL (ref 3.7–10.3)

## 2025-06-10 ENCOUNTER — OFFICE VISIT (OUTPATIENT)
Dept: ORTHOPEDIC SURGERY | Age: 30
End: 2025-06-10

## 2025-06-10 VITALS — HEIGHT: 64 IN | BODY MASS INDEX: 32.61 KG/M2 | WEIGHT: 191 LBS

## 2025-06-10 DIAGNOSIS — M00.9 PYOGENIC ARTHRITIS OF RIGHT KNEE JOINT, DUE TO UNSPECIFIED ORGANISM (HCC): Primary | ICD-10-CM

## 2025-06-10 PROCEDURE — 99024 POSTOP FOLLOW-UP VISIT: CPT | Performed by: ORTHOPAEDIC SURGERY

## 2025-06-11 ENCOUNTER — TELEPHONE (OUTPATIENT)
Dept: INFECTIOUS DISEASES | Age: 30
End: 2025-06-11

## 2025-06-13 NOTE — PROGRESS NOTES
DIAGNOSIS:  Right septic knee, s/p arthrotomy and incision and drainage.    DATE OF SURGERY:  5/26/2025.    HISTORY OF PRESENT ILLNESS:  Ms. Carson 30 y.o.  female who came in today for 2 weeks postoperative visit.  The patient denies any significant pain in the right knee. Rates pain a 4/10 VAS moderate, aching, intermittent and are improving. Aggravating factors movement. Alleviating factors rest. She has been WBAT.  No numbness or tingling sensation. No fever or Chills. She grew up NGTD and on IV antibiotics through PICC.    PHYSICAL EXAMINATION:  The incision healing well, sutures intact .  No signs of any erythema or drainage, moderate swelling. She has no pain with the active or passive range of motion of the right knee, good ROM flexion and extension.  She has intact sensation distally, and she is neurovascularly intact. Sutures removed 6/10/2025.    IMPRESSION:  2 weeks out from right septic knee s/p  incision and drainage and arthrotomy, and doing very well.    PLAN: She can go back to normal activity with no heavy impact activities for 6 weeks. Continue antibiotics managed by ID.  Dressing change daily and PRN. The patient will come back for a follow up in 6 weeks, PT if indicated.      The patient understands that there is a chance of septic knee recurrence even after surgical I&D.      Jose A Clemente MD'

## 2025-06-16 ENCOUNTER — TELEPHONE (OUTPATIENT)
Dept: INFECTIOUS DISEASES | Age: 30
End: 2025-06-16

## 2025-06-16 LAB
ALBUMIN: 4 GM/DL (ref 3.5–5.2)
ALP BLD-CCNC: 58 U/L (ref 36–123)
ALT SERPL-CCNC: 10 U/L
ANION GAP SERPL CALCULATED.3IONS-SCNC: 10 MMOL/L (ref 7–16)
AST SERPL-CCNC: 15 U/L
BACTERIA FLD AEROBE CULT: NORMAL
BACTERIA SPEC AEROBE CULT: NORMAL
BACTERIA SPEC ANAEROBE CULT: NORMAL
BASOPHILS ABSOLUTE: 0.1 X10(3)/MCL (ref 0–0.1)
BASOPHILS RELATIVE PERCENT: 1 %
BILIRUB SERPL-MCNC: <0.2 MG/DL (ref 0.2–1.3)
BUN BLDV-MCNC: 12 MG/DL (ref 6–20)
C-REACTIVE PROTEIN WIDE RANGE: 21.36 MG/L
CALCIUM SERPL-MCNC: 9 MG/DL (ref 8.6–10.4)
CHLORIDE BLD-SCNC: 104 MMOL/L (ref 98–107)
CO2: 25 MMOL/L (ref 22–29)
CREAT SERPL-MCNC: 0.73 MG/DL (ref 0.51–1.3)
CREATINE KINASE ISOENZYMES, SER/PLAS: 63 U/L (ref 26–192)
EGFR (CKD-EPI): 111 ML/MIN/1.73 M2
EOSINOPHIL # BLD: 7.5 %
EOSINOPHILS ABSOLUTE: 0.5 X10(3)/MCL (ref 0–0.5)
GLUCOSE BLD-MCNC: 86 MG/DL (ref 70–99)
GRAM STN SPEC: NORMAL
GRAM STN SPEC: NORMAL
HCT VFR BLD CALC: 35.5 % (ref 34–45)
HEMOGLOBIN: 11.3 G/DL (ref 11.2–15.7)
IMMATURE CELLS ABSOLUTE COUNT: 0 X10(3)/MCL (ref 0–0.1)
IMMATURE GRANULOCYTES %: 0 %
LYMPHOCYTES # BLD: 26.9 %
LYMPHOCYTES ABSOLUTE: 1.6 X10(3)/MCL (ref 1.2–3.9)
MCH RBC QN AUTO: 30.8 PG (ref 26–34)
MCHC RBC AUTO-ENTMCNC: 31.8 G/DL (ref 30.7–35.5)
MCV RBC AUTO: 96.7 FL (ref 80–100)
MONOCYTES ABSOLUTE: 0.6 X10(3)/MCL (ref 0.3–0.9)
MONOCYTES RELATIVE PERCENT: 9.1 %
NEUTROPHILS ABSOLUTE: 3.3 X10(3)/MCL (ref 1.6–6.1)
NEUTROPHILS: 55.5 %
PDW BLD-RTO: 12.6 %
PLATELET # BLD: 257 X10(3)/MCL (ref 155–369)
PMV BLD AUTO: 11 FL (ref 8.8–12.5)
POTASSIUM SERPL-SCNC: 4.5 MMOL/L (ref 3.5–5)
RBC # BLD: 3.67 X10(6)/MCL (ref 3.9–5.2)
SED RATE, AUTOMATED: 17 MM/HR (ref 0–20)
SODIUM BLD-SCNC: 139 MMOL/L (ref 136–145)
TOTAL PROTEIN: 6.3 GM/DL (ref 6.4–8.3)
WBC # BLD: 6 X10(3)/MCL (ref 3.7–10.3)

## 2025-06-16 RX ORDER — FLUCONAZOLE 150 MG/1
150 TABLET ORAL ONCE
Qty: 1 TABLET | Refills: 0 | Status: SHIPPED | OUTPATIENT
Start: 2025-06-16 | End: 2025-06-16

## 2025-06-16 NOTE — TELEPHONE ENCOUNTER
Assuming this is vaginal? Sent fluconazole x 1.  Will look for labs this week and likely end OPAT as planned.

## 2025-06-16 NOTE — TELEPHONE ENCOUNTER
OPAT Nurse Coordinator Weekly Update Note    Current OPAT plan:  daptomycin 400mg qd and ceftriaxone 2g qd   - Planned End date: 6/23/2025    Diagnosis:  cx neg right knee septic arthritis    Assessment:  Spoke  with patient who reports she is doing well.  She saw ortho last Thursday.  He removed sutures. Incision intact with no drainage.  Swelling is going down.  Denies fevers.  She reports she started with a yeast infection. She has tried OTC treatment without success.  RX can be sent to [facility].  [facility] RN coming today    Follow up appts:  Dr. Clemente 7/22

## 2025-06-17 NOTE — TELEPHONE ENCOUNTER
Reviewed labs which are stable.  ESR has been and remains normal.  CRP has been steadily declining and is slightly bumped to 21. Perhaps from yeast infection?    Her R knee cx have remained NGTD and she has completed 4 weeks of broad empiric therapy.     We will end OPAT as planned after 6/23.    Ronna Leslie, PharmD, [facility]  Clinical Pharmacy Specialist- [facility]  Phone: [phone] (also available on [employer])  Fax: [phone]    For Pharmacy Admin Tracking Only    Program: Medical Group  CPA in place: Yes  Intervention Detail: Discontinued Rx: 2, reason: Therapy Complete  Time Spent (min): 15

## 2025-06-18 NOTE — TELEPHONE ENCOUNTER
OPAT End  Call made to pharmacy  Spoke with Perri at [facility] and advised of pharmacist note ok to end IV abx and pull PICC as planned on 6/23. She v/u    Call made to [facility]  Spoke with Winifred SILVEIRA at [facility] and advised of above.  She v/u     Call made to patient  LVM for patient and advised of above.  Office contact information provided.       Will f/u in 1-2 days to verify line removal

## (undated) DEVICE — NEEDLE,22GX1.5",REG,BEVEL: Brand: MEDLINE

## (undated) DEVICE — BLADE,CARBON-STEEL,10,STRL,DISPOSABLE,TB: Brand: MEDLINE

## (undated) DEVICE — SWAB CULT DBL W/O CHAR RAYON TIP AMIES GEL CLMN FOR COLL

## (undated) DEVICE — YANKAUER,OPEN TIP,W/O VENT,STERILE: Brand: MEDLINE INDUSTRIES, INC.

## (undated) DEVICE — SUTURE VICRYL + SZ 3-0 L18IN CT 1 CR ABSRB VCP838D

## (undated) DEVICE — SUTURE NONABSORBABLE MONOFILAMENT 2-0 FS 18 IN ETHILON 664H

## (undated) DEVICE — COVER,MAYO STAND,XL,STERILE: Brand: MEDLINE

## (undated) DEVICE — SUTURE PDS + SZ 0 L27IN ABSRB VLT L36MM CT 1 1 2 CIR PDP340H

## (undated) DEVICE — GOWN,SIRUS,POLYRNF,BRTHSLV,XL,30/CS: Brand: MEDLINE

## (undated) DEVICE — TRAP FLUID

## (undated) DEVICE — 3M™ STERI-DRAPE™ U-DRAPE 1015: Brand: STERI-DRAPE™

## (undated) DEVICE — SUTURE VICRYL + SZ 0 L18IN ABSRB UD L36MM CT-1 1/2 CIR VCP840D

## (undated) DEVICE — SUTURE VICRYL SZ 4-0 L18IN ABSRB UD L19MM PS-2 3/8 CIR PRIM J496H

## (undated) DEVICE — TOWEL,STOP FLAG GOLD N-W: Brand: MEDLINE

## (undated) DEVICE — SUTURE PDS + SZ 2 0 L27IN ABSRB VLT L26MM CT 2 1 2 CIR PDP333H

## (undated) DEVICE — SOLUTION IV 1000ML 0.9% SOD CHL

## (undated) DEVICE — GLOVE ORTHO 8   MSG9480

## (undated) DEVICE — SYRINGE MED 30ML STD CLR PLAS LUERLOCK TIP N CTRL DISP

## (undated) DEVICE — HIGH FLOW TIP

## (undated) DEVICE — SPONGE,LAP,18"X18",DLX,XR,ST,5/PK,40/PK: Brand: MEDLINE

## (undated) DEVICE — HANDPIECE SUCTION TUBING INTERPULSE 10FT

## (undated) DEVICE — LOWER EXTREMITY: Brand: MEDLINE INDUSTRIES, INC.

## (undated) DEVICE — CONTAINER,SPECIMEN,PNEU TUBE,3OZ,OR STRL: Brand: MEDLINE

## (undated) DEVICE — APPLICATOR MEDICATED 26 CC SOLUTION HI LT ORNG CHLORAPREP

## (undated) DEVICE — SOLUTION SURG PREP 26 CC PURPREP

## (undated) DEVICE — PAD,ABDOMINAL,5"X9",ST,LF,25/BX: Brand: MEDLINE INDUSTRIES, INC.